# Patient Record
Sex: MALE | Race: WHITE | NOT HISPANIC OR LATINO
[De-identification: names, ages, dates, MRNs, and addresses within clinical notes are randomized per-mention and may not be internally consistent; named-entity substitution may affect disease eponyms.]

---

## 2017-05-03 ENCOUNTER — APPOINTMENT (OUTPATIENT)
Dept: HEART AND VASCULAR | Facility: CLINIC | Age: 77
End: 2017-05-03

## 2017-05-03 VITALS — HEART RATE: 72 BPM

## 2017-05-03 VITALS
SYSTOLIC BLOOD PRESSURE: 146 MMHG | HEIGHT: 71 IN | BODY MASS INDEX: 36.26 KG/M2 | WEIGHT: 259 LBS | DIASTOLIC BLOOD PRESSURE: 80 MMHG

## 2017-05-23 ENCOUNTER — OUTPATIENT (OUTPATIENT)
Dept: OUTPATIENT SERVICES | Facility: HOSPITAL | Age: 77
LOS: 1 days | Discharge: HOME | End: 2017-05-23
Payer: COMMERCIAL

## 2017-05-23 DIAGNOSIS — I48.91 UNSPECIFIED ATRIAL FIBRILLATION: ICD-10-CM

## 2017-05-23 DIAGNOSIS — Z98.890 OTHER SPECIFIED POSTPROCEDURAL STATES: Chronic | ICD-10-CM

## 2017-05-23 PROCEDURE — 99234 HOSP IP/OBS SM DT SF/LOW 45: CPT

## 2017-05-23 PROCEDURE — 92960 CARDIOVERSION ELECTRIC EXT: CPT

## 2017-05-23 RX ORDER — FUROSEMIDE 40 MG
1 TABLET ORAL
Qty: 7 | Refills: 0
Start: 2017-05-23 | End: 2017-05-30

## 2017-07-26 ENCOUNTER — APPOINTMENT (OUTPATIENT)
Dept: HEART AND VASCULAR | Facility: CLINIC | Age: 77
End: 2017-07-26
Payer: MEDICARE

## 2017-07-26 VITALS
SYSTOLIC BLOOD PRESSURE: 134 MMHG | HEART RATE: 97 BPM | DIASTOLIC BLOOD PRESSURE: 89 MMHG | BODY MASS INDEX: 36.12 KG/M2 | WEIGHT: 259 LBS

## 2017-07-26 PROCEDURE — 99215 OFFICE O/P EST HI 40 MIN: CPT

## 2017-07-26 PROCEDURE — 93000 ELECTROCARDIOGRAM COMPLETE: CPT

## 2017-08-08 ENCOUNTER — OTHER (OUTPATIENT)
Age: 77
End: 2017-08-08

## 2017-08-08 RX ORDER — METOPROLOL TARTRATE 50 MG/1
50 TABLET, FILM COATED ORAL TWICE DAILY
Qty: 120 | Refills: 3 | Status: DISCONTINUED | COMMUNITY
Start: 2017-05-03 | End: 2017-08-08

## 2017-08-22 ENCOUNTER — MEDICATION RENEWAL (OUTPATIENT)
Age: 77
End: 2017-08-22

## 2017-08-22 RX ORDER — METOPROLOL TARTRATE 100 MG/1
100 TABLET, FILM COATED ORAL
Qty: 60 | Refills: 6 | Status: ACTIVE | COMMUNITY
Start: 2017-08-08 | End: 1900-01-01

## 2020-07-28 ENCOUNTER — INPATIENT (INPATIENT)
Facility: HOSPITAL | Age: 80
LOS: 2 days | Discharge: HOME | DRG: 308 | End: 2020-07-31
Attending: INTERNAL MEDICINE | Admitting: INTERNAL MEDICINE
Payer: MEDICARE

## 2020-07-28 VITALS
TEMPERATURE: 97 F | OXYGEN SATURATION: 92 % | WEIGHT: 220.02 LBS | DIASTOLIC BLOOD PRESSURE: 58 MMHG | RESPIRATION RATE: 24 BRPM | HEART RATE: 125 BPM | HEIGHT: 70 IN | SYSTOLIC BLOOD PRESSURE: 114 MMHG

## 2020-07-28 DIAGNOSIS — I10 ESSENTIAL (PRIMARY) HYPERTENSION: ICD-10-CM

## 2020-07-28 DIAGNOSIS — Z98.890 OTHER SPECIFIED POSTPROCEDURAL STATES: Chronic | ICD-10-CM

## 2020-07-28 DIAGNOSIS — I48.91 UNSPECIFIED ATRIAL FIBRILLATION: ICD-10-CM

## 2020-07-28 DIAGNOSIS — E78.5 HYPERLIPIDEMIA, UNSPECIFIED: ICD-10-CM

## 2020-07-28 DIAGNOSIS — N17.9 ACUTE KIDNEY FAILURE, UNSPECIFIED: ICD-10-CM

## 2020-07-28 DIAGNOSIS — E87.70 FLUID OVERLOAD, UNSPECIFIED: ICD-10-CM

## 2020-07-28 DIAGNOSIS — Z13.1 ENCOUNTER FOR SCREENING FOR DIABETES MELLITUS: ICD-10-CM

## 2020-07-28 LAB
ALBUMIN SERPL ELPH-MCNC: 4.1 G/DL — SIGNIFICANT CHANGE UP (ref 3.3–5)
ALP SERPL-CCNC: 141 U/L — HIGH (ref 40–120)
ALT FLD-CCNC: 24 U/L — SIGNIFICANT CHANGE UP (ref 10–45)
ANION GAP SERPL CALC-SCNC: 10 MMOL/L — SIGNIFICANT CHANGE UP (ref 5–17)
APPEARANCE UR: CLEAR — SIGNIFICANT CHANGE UP
AST SERPL-CCNC: 30 U/L — SIGNIFICANT CHANGE UP (ref 10–40)
BILIRUB SERPL-MCNC: 1.7 MG/DL — HIGH (ref 0.2–1.2)
BILIRUB UR-MCNC: NEGATIVE — SIGNIFICANT CHANGE UP
BUN SERPL-MCNC: 34 MG/DL — HIGH (ref 7–23)
CALCIUM SERPL-MCNC: 10 MG/DL — SIGNIFICANT CHANGE UP (ref 8.4–10.5)
CHLORIDE SERPL-SCNC: 101 MMOL/L — SIGNIFICANT CHANGE UP (ref 96–108)
CO2 SERPL-SCNC: 27 MMOL/L — SIGNIFICANT CHANGE UP (ref 22–31)
COLOR SPEC: YELLOW — SIGNIFICANT CHANGE UP
CREAT SERPL-MCNC: 1.53 MG/DL — HIGH (ref 0.5–1.3)
DIFF PNL FLD: NEGATIVE — SIGNIFICANT CHANGE UP
GLUCOSE SERPL-MCNC: 93 MG/DL — SIGNIFICANT CHANGE UP (ref 70–99)
GLUCOSE UR QL: NEGATIVE — SIGNIFICANT CHANGE UP
HCT VFR BLD CALC: 41 % — SIGNIFICANT CHANGE UP (ref 39–50)
HGB BLD-MCNC: 13.1 G/DL — SIGNIFICANT CHANGE UP (ref 13–17)
INR BLD: 2.77 — HIGH (ref 0.88–1.16)
KETONES UR-MCNC: NEGATIVE — SIGNIFICANT CHANGE UP
LACTATE SERPL-SCNC: 1.5 MMOL/L — SIGNIFICANT CHANGE UP (ref 0.5–2)
LEUKOCYTE ESTERASE UR-ACNC: NEGATIVE — SIGNIFICANT CHANGE UP
MCHC RBC-ENTMCNC: 32 GM/DL — SIGNIFICANT CHANGE UP (ref 32–36)
MCHC RBC-ENTMCNC: 35 PG — HIGH (ref 27–34)
MCV RBC AUTO: 109.6 FL — HIGH (ref 80–100)
NITRITE UR-MCNC: NEGATIVE — SIGNIFICANT CHANGE UP
NRBC # BLD: 0 /100 WBCS — SIGNIFICANT CHANGE UP (ref 0–0)
NT-PROBNP SERPL-SCNC: 3497 PG/ML — HIGH (ref 0–300)
PH UR: 6 — SIGNIFICANT CHANGE UP (ref 5–8)
PLATELET # BLD AUTO: 152 K/UL — SIGNIFICANT CHANGE UP (ref 150–400)
POTASSIUM SERPL-MCNC: 4.6 MMOL/L — SIGNIFICANT CHANGE UP (ref 3.5–5.3)
POTASSIUM SERPL-SCNC: 4.6 MMOL/L — SIGNIFICANT CHANGE UP (ref 3.5–5.3)
PROT SERPL-MCNC: 7.1 G/DL — SIGNIFICANT CHANGE UP (ref 6–8.3)
PROT UR-MCNC: NEGATIVE MG/DL — SIGNIFICANT CHANGE UP
PROTHROM AB SERPL-ACNC: 31.7 SEC — HIGH (ref 10.6–13.6)
RBC # BLD: 3.74 M/UL — LOW (ref 4.2–5.8)
RBC # FLD: 15.4 % — HIGH (ref 10.3–14.5)
SARS-COV-2 RNA SPEC QL NAA+PROBE: SIGNIFICANT CHANGE UP
SODIUM SERPL-SCNC: 138 MMOL/L — SIGNIFICANT CHANGE UP (ref 135–145)
SP GR SPEC: 1.02 — SIGNIFICANT CHANGE UP (ref 1–1.03)
TROPONIN T SERPL-MCNC: 0.08 NG/ML — CRITICAL HIGH (ref 0–0.01)
UROBILINOGEN FLD QL: 0.2 E.U./DL — SIGNIFICANT CHANGE UP
WBC # BLD: 5.31 K/UL — SIGNIFICANT CHANGE UP (ref 3.8–10.5)
WBC # FLD AUTO: 5.31 K/UL — SIGNIFICANT CHANGE UP (ref 3.8–10.5)

## 2020-07-28 PROCEDURE — 71045 X-RAY EXAM CHEST 1 VIEW: CPT | Mod: 26

## 2020-07-28 PROCEDURE — 99285 EMERGENCY DEPT VISIT HI MDM: CPT | Mod: 25

## 2020-07-28 PROCEDURE — 71275 CT ANGIOGRAPHY CHEST: CPT | Mod: 26

## 2020-07-28 PROCEDURE — 93010 ELECTROCARDIOGRAM REPORT: CPT

## 2020-07-28 RX ORDER — DEXTROSE 50 % IN WATER 50 %
12.5 SYRINGE (ML) INTRAVENOUS ONCE
Refills: 0 | Status: DISCONTINUED | OUTPATIENT
Start: 2020-07-28 | End: 2020-07-31

## 2020-07-28 RX ORDER — SODIUM CHLORIDE 9 MG/ML
500 INJECTION INTRAMUSCULAR; INTRAVENOUS; SUBCUTANEOUS ONCE
Refills: 0 | Status: COMPLETED | OUTPATIENT
Start: 2020-07-28 | End: 2020-07-28

## 2020-07-28 RX ORDER — ATORVASTATIN CALCIUM 80 MG/1
40 TABLET, FILM COATED ORAL AT BEDTIME
Refills: 0 | Status: DISCONTINUED | OUTPATIENT
Start: 2020-07-28 | End: 2020-07-31

## 2020-07-28 RX ORDER — PANTOPRAZOLE SODIUM 20 MG/1
40 TABLET, DELAYED RELEASE ORAL
Refills: 0 | Status: DISCONTINUED | OUTPATIENT
Start: 2020-07-28 | End: 2020-07-31

## 2020-07-28 RX ORDER — INSULIN LISPRO 100/ML
VIAL (ML) SUBCUTANEOUS
Refills: 0 | Status: DISCONTINUED | OUTPATIENT
Start: 2020-07-28 | End: 2020-07-31

## 2020-07-28 RX ORDER — DEXTROSE 50 % IN WATER 50 %
25 SYRINGE (ML) INTRAVENOUS ONCE
Refills: 0 | Status: DISCONTINUED | OUTPATIENT
Start: 2020-07-28 | End: 2020-07-31

## 2020-07-28 RX ORDER — COLCHICINE 0.6 MG
0.6 TABLET ORAL DAILY
Refills: 0 | Status: DISCONTINUED | OUTPATIENT
Start: 2020-07-28 | End: 2020-07-28

## 2020-07-28 RX ORDER — FUROSEMIDE 40 MG
20 TABLET ORAL
Refills: 0 | Status: DISCONTINUED | OUTPATIENT
Start: 2020-07-28 | End: 2020-07-29

## 2020-07-28 RX ORDER — IPRATROPIUM/ALBUTEROL SULFATE 18-103MCG
3 AEROSOL WITH ADAPTER (GRAM) INHALATION ONCE
Refills: 0 | Status: COMPLETED | OUTPATIENT
Start: 2020-07-28 | End: 2020-07-28

## 2020-07-28 RX ORDER — DEXTROSE 50 % IN WATER 50 %
15 SYRINGE (ML) INTRAVENOUS ONCE
Refills: 0 | Status: DISCONTINUED | OUTPATIENT
Start: 2020-07-28 | End: 2020-07-31

## 2020-07-28 RX ORDER — SODIUM CHLORIDE 9 MG/ML
1000 INJECTION, SOLUTION INTRAVENOUS
Refills: 0 | Status: DISCONTINUED | OUTPATIENT
Start: 2020-07-28 | End: 2020-07-31

## 2020-07-28 RX ORDER — RIVAROXABAN 15 MG-20MG
20 KIT ORAL
Refills: 0 | Status: DISCONTINUED | OUTPATIENT
Start: 2020-07-28 | End: 2020-07-31

## 2020-07-28 RX ORDER — GLUCAGON INJECTION, SOLUTION 0.5 MG/.1ML
1 INJECTION, SOLUTION SUBCUTANEOUS ONCE
Refills: 0 | Status: DISCONTINUED | OUTPATIENT
Start: 2020-07-28 | End: 2020-07-31

## 2020-07-28 RX ADMIN — Medication 3 MILLILITER(S): at 17:03

## 2020-07-28 RX ADMIN — SODIUM CHLORIDE 500 MILLILITER(S): 9 INJECTION INTRAMUSCULAR; INTRAVENOUS; SUBCUTANEOUS at 19:03

## 2020-07-28 RX ADMIN — Medication 125 MILLIGRAM(S): at 17:03

## 2020-07-28 NOTE — H&P ADULT - PROBLEM SELECTOR PLAN 3
-unknown baseline, Cr. 1.53  -holding home ACE  -continue to monitor -unknown baseline, Cr. 1.53  -holding ACE/ avoid nephrotoxic agents   -continue to monitor

## 2020-07-28 NOTE — H&P ADULT - NSHPSOCIALHISTORY_GEN_ALL_CORE
Denies tobacco, ETOH, or illicit drug use Former smoker x 20 years, quit 30 years ago  Denies ETOH or illicit drug use

## 2020-07-28 NOTE — ED PROVIDER NOTE - DIAGNOSTIC INTERPRETATION
History of Present Illness


General


Chief Complaint:  To Be Triaged





Medical Decision Making


Diagnostic Impression:  


 Primary Impression:  


 Patient left without being seen


ER Course


patient left prior to RN triage or MD evaluation


Status:  unchanged


Disposition:  LEFT W/OUT BEING SEEN


Condition:  Unknown


Referrals:  


NOT CHOSEN IPA/MD,REFERRING (PCP)











BETZY VASQUEZ M.D. Feb 27, 2017 17:29 ER Physician: Filiberto Jordan  CHEST XRAY INTERPRETATION: lungs L lower lobe effusion, mild haziness R base, heart shadow enlarged, bony structures intact

## 2020-07-28 NOTE — H&P ADULT - ASSESSMENT
Patient is a 81 y/o male with PMHx of afib/flutter (hx of ablation/DCCV, w/recurrent afib/flutter refusing subsequent ablations, on Xarelto), asthma (no intubations), HTN, hypothyroidism, DM, HLD who presents to [facility] ED 07/28/2020 c/o LE edema and palpitations. Now admitted to [facility] for management of fluid overload in the setting of rapid aflutter.

## 2020-07-28 NOTE — ED ADULT NURSE NOTE - NSINTERVENTIONOPT_GEN_ALL_ED
Hourly Rounding/Enhanced Supervision/Move Toilet (commode/urinal) to patient using "arms reach"/Stretcher Alarms

## 2020-07-28 NOTE — H&P ADULT - NSICDXPASTMEDICALHX_GEN_ALL_CORE_FT
PAST MEDICAL HISTORY:  Atrial fibrillation and flutter     DM (diabetes mellitus screen)     Essential hypertension     History of hypothyroidism     Uncomplicated asthma, unspecified asthma severity

## 2020-07-28 NOTE — H&P ADULT - PROBLEM SELECTOR PLAN 1
-On arrival to ED, patient HR 120s in aflutter, currently patient rate controlled HR 80-90s  -EKG on admission: aflutter @ 123bpm, RBBB, Trops x1 0.08, f/u trops @ 11pm and AM  -BB on hold 2/2 fluid overload, continue home xarelto  -EP consult in AM (patient follows up with Dr. Amaya) -On arrival to ED, patient HR 120s in aflutter, currently patient rate controlled HR 80-90s  -EKG on admission: aflutter @ 123bpm, RBBB, Trops x1 0.08, f/u trops @ 11pm and AM  -BB on hold 2/2 fluid overload, continue home xarelto  -EP consult in AM (patient follows up with Dr. Amaya)  -f/u TSH

## 2020-07-28 NOTE — H&P ADULT - HISTORY OF PRESENT ILLNESS
Patient is a 81 y/o male with PMHx of afib/flutter (hx of ablation/DCCV, w/recurrent afib/flutter refusing subsequent ablations, on Xarelto), asthma (no intubations), HTN, hypothyroidism, DM, HLD who presents to [facility] ED 07/28/2020 c/o LE edema.     Patient denies CP, SOB, palpitations, syncope, PND/orthopnea, fever, chills, sick contacts, recent travel/illness, URI symptoms, abdominal pain, or any other complaints.     Of note patient admits to being apartment bound for 6 weeks, rarely ever leaving apartment.     In ED VS /58, , RR 24, Temp 97.2F, O2 92% RA, Labs INR 2.77, Cr 1.53, Trops x1 0.08, BNP 3497, UA negative, COVID negative, EKG a-flutter @ 123bpm, RBBB, CXR wet read left sided effusion, cardiomegaly. In ED patient give IVF 1/2L bolus x1, Duonebs x1, and IV solu-medrol 125mg x1. Upon examination no current complaints, HR 80-90s, satting 100% 4L NC.     Patient now admitted to [facility] for management of fluid overload in the setting of rapid aflutter Patient is a 79 y/o male with PMHx of afib/flutter (hx of ablation/DCCV, w/recurrent afib/flutter refusing subsequent ablations, on Xarelto), asthma (no intubations), HTN, hypothyroidism, DM, HLD who presents to [facility] ED 07/28/2020 c/o LE edema.     Patient denies CP, SOB, palpitations, syncope, PND/orthopnea, fever, chills, sick contacts, recent travel/illness, URI symptoms, abdominal pain, or any other complaints.     Of note patient admits to being apartment bound for 6 weeks, rarely ever leaving apartment.     In ED VS /58, , RR 24, Temp 97.2F, O2 92% RA, Labs INR 2.77, Cr 1.53, Trops x1 0.08, BNP 3497, UA negative, COVID negative, EKG a-flutter @ 123bpm, RBBB, CXR wet read left sided effusion, vascular congestion, cardiomegaly. In ED patient give IVF 1/2L bolus x1, Duonebs x1, and IV solu-medrol 125mg x1. Upon examination no current complaints, HR 80-90s, satting 100% 4L NC.     Patient now admitted to [facility] for management of fluid overload in the setting of rapid aflutter Patient is a 81 y/o male with PMHx of afib/flutter (hx of ablation/DCCV, w/recurrent afib/flutter refusing subsequent ablations, on Xarelto), asthma (no intubations), HTN, hypothyroidism, DM, HLD who presents to [facility] ED 07/28/2020 c/o LE edema and palpitations.     Patient denies CP, SOB, syncope, PND/orthopnea, fever, chills, sick contacts, recent travel/illness, URI symptoms, abdominal pain, or any other complaints.     Of note patient admits to being apartment bound for 6 weeks, rarely ever leaving apartment.     In ED VS /58, , RR 24, Temp 97.2F, O2 92% RA, Labs INR 2.77, Cr 1.53, Trops x1 0.08, BNP 3497, UA negative, COVID negative, EKG a-flutter @ 123bpm, RBBB, CXR wet read left sided effusion, vascular congestion, cardiomegaly. In ED patient give IVF 1/2L bolus x1, Duonebs x1, and IV solu-medrol 125mg x1. Upon examination no current complaints, HR 80-90s, satting 100% 4L NC.     Patient now admitted to [facility] for management of fluid overload in the setting of rapid aflutter Patient is a 79 y/o male former smoker with PMHx of afib/flutter (hx of ablation/DCCV, w/recurrent afib/flutter refusing subsequent ablations, on Xarelto), asthma (no intubations), HTN, hypothyroidism, DM, HLD who presents to [facility] ED 07/28/2020 c/o LE edema, SOB, and palpitations. Patient c/o 6 weeks of worsening LE edema, and abdominal distension. He explains since swelling has worsened he is no longer able to fit in his sneakers. He also endorses intermittent episodes of progressively worsening SOB which occurs independent of activity. Patient reports for the last two days he fluttering in chest which exacerbates his SOB. Since symptoms started he states his exercise tolerance has worsened, and ADLs have been harder to do. Patient denies   syncope, PND/orthopnea, fever, chills, sick contacts, recent travel/illness, URI symptoms, abdominal pain, or any other complaints. Of note patient admits to being apartment bound for 6 weeks, rarely ever leaving apartment. In ED VS /58, , RR 24, Temp 97.2F, O2 92% RA, Labs INR 2.77, Cr 1.53, Trops x1 0.08, BNP 3497, UA negative, COVID negative, EKG a-flutter @ 123bpm, RBBB, CXR wet read left sided effusion, vascular congestion, cardiomegaly. In ED patient give IVF 1/2L bolus x1, Duonebs x1, and IV solu-medrol 125mg x1. Upon examination no current complaints, HR 80-90s, satting 100% 4L NC.     Patient now admitted to [facility] for management of fluid overload in the setting of rapid aflutter Patient is a 79 y/o male former smoker with PMHx of afib/flutter (hx of ablation/DCCV, w/recurrent afib/flutter refusing subsequent ablations, on Xarelto), asthma (no intubations), HTN, hypothyroidism, DM, HLD who presents to [facility] ED 07/28/2020 c/o LE edema, SOB, and palpitations. Patient c/o 6 weeks of worsening LE edema, and abdominal distension. He explains since swelling has worsened he is no longer able to fit in his sneakers. He also endorses intermittent episodes of progressively worsening SOB which occurs independent of activity. Patient reports for the last two days fluttering in chest which exacerbates his SOB. Since symptoms started he states his exercise tolerance has worsened, and ADLs have been harder to do. Patient denies syncope, PND/orthopnea, fever, chills, sick contacts, recent travel/illness, URI symptoms, abdominal pain, or any other complaints. Of note patient admits to being apartment bound for 6 weeks, rarely ever leaving apartment. In ED VS /58, , RR 24, Temp 97.2F, O2 92% RA, Labs INR 2.77, Cr 1.53, Trops x1 0.08, BNP 3497, UA negative, COVID negative, EKG a-flutter @ 123bpm, RBBB, CXR wet read left sided effusion, vascular congestion, cardiomegaly. In ED patient give IVF 1/2L bolus x1, Duonebs x1, and IV solu-medrol 125mg x1. Upon examination no current complaints, HR 80-90s, satting 100% 4L NC.     Patient now admitted to [facility] for management of fluid overload in the setting of rapid aflutter

## 2020-07-28 NOTE — H&P ADULT - PROBLEM SELECTOR PLAN 2
-3+ pitting edema bilaterally, mild cyanosis of LE, warm to touch, on admission satting 92% on RA, O2 sat 100% on 4L NC  --given Duonebs x1 and IV solu-medrol 125mg x1 in ED  -CXR wet read left sided pleural effusion, vascular congestion, cardiomegaly, f/u official  -started IV Lasix 20mg BID  -core measures ordered, 1L fluid restrictions, strict I/Os, daily weights   -Echo and LE duplex ordered f/u, f/u D-dimer -3+ pitting edema bilaterally, mild cyanosis of LE, warm to touch, on admission satting 92% on RA, O2 sat 100% on 4L NC  --given Duonebs x1 and IV solu-medrol 125mg x1 in ED  -CXR wet read left sided pleural effusion, vascular congestion, cardiomegaly, f/u official  -started IV Lasix 20mg BID  -core measures ordered, 1L fluid restrictions, strict I/Os, daily weights   -Echo and LE duplex ordered f/u, f/u D-dimer  -Blood Cx sent in ED f/u -No hx of CHF per patient   -3+ pitting edema bilaterally, mild cyanosis of LE, warm to touch, on admission satting 92% on RA, O2 sat 100% on 4L NC  --given Duonebs x1 and IV solu-medrol 125mg x1 in ED  -CXR wet read left sided pleural effusion, vascular congestion, cardiomegaly, f/u official  -started IV Lasix 20mg BID  -core measures ordered, 1L fluid restrictions, strict I/Os, daily weights   -Echo and LE duplex ordered f/u, f/u D-dimer  -Blood Cx sent in ED f/u -No hx of CHF per patient   -3+ pitting edema bilaterally, mild cyanosis of LE, warm to touch, on admission satting 92% on RA, O2 sat 100% on 4L NC, BNP 3497  --given Duonebs x1 and IV solu-medrol 125mg x1 in ED  -CXR wet read left sided pleural effusion, vascular congestion, cardiomegaly, f/u official  -started IV Lasix 20mg BID  -core measures ordered, 1L fluid restrictions, strict I/Os, daily weights   -Echo and LE duplex ordered f/u, f/u D-dimer  -Blood Cx sent in ED f/u -No hx of CHF per patient   -3+ pitting edema bilaterally, mild cyanosis of LE, warm to touch, on admission satting 92% on RA, O2 sat 100% on 4L NC, BNP 3497, given IV Lasix 20mg x1 stat on admission   --given Duonebs x1 and IV solu-medrol 125mg x1 in ED  -CXR wet read left sided pleural effusion, vascular congestion, cardiomegaly, f/u official  -started IV Lasix 20mg BID  -core measures ordered, 1L fluid restrictions, strict I/Os, daily weights   -Echo and LE duplex ordered f/u, f/u D-dimer  -Blood Cx sent in ED f/u -No hx of CHF per patient   -3+ pitting edema bilaterally, JVD, decreased BS LLL, on admission satting 92% on RA, O2 sat 100% on 4L NC, BNP 3497, given IV Lasix 20mg x1 stat on admission   --given Duonebs x1 and IV solu-medrol 125mg x1 in ED  -CXR wet read left sided pleural effusion, vascular congestion, cardiomegaly, f/u official  -started IV Lasix 20mg BID  -core measures ordered, 1L fluid restrictions, strict I/Os, daily weights   -Echo and LE duplex ordered f/u, f/u D-dimer  -Blood Cx sent in ED f/u

## 2020-07-28 NOTE — H&P ADULT - PROBLEM SELECTOR PLAN 6
-f/u LDL  -continue atorvastatin 40mg QD    PT consulted  DVT PPx: Xarelto  Dispo: EP consult, diuresis

## 2020-07-28 NOTE — ED ADULT TRIAGE NOTE - CHIEF COMPLAINT QUOTE
Patient presents complaining of worsening sob, diarrhea, and bilateral feet and leg swelling x 6 weeks.  Patient presents without shoes on because he can no longer wear his sneakers. Denies fevers or chills.

## 2020-07-28 NOTE — PROGRESS NOTE ADULT - SUBJECTIVE AND OBJECTIVE BOX
79 y/o male hx ablation dr jackson  cardiomyopathy recurrent aflutter refused ablation admitted with atrial flutter fluid overload on beta blocker and xarelto hemodynamically stable lungs reduced air entry bilateral bases heart s1s2 2/6 holosystolic apical murmur bp 130/80 diuretics echo ep to see discussed with all concerned

## 2020-07-28 NOTE — ED PROVIDER NOTE - OBJECTIVE STATEMENT
81 y/o male hx ablation dr jackson cardiomyopathy recurrent aflutter refused ablation admitted with atrial flutter fluid overload on beta blocker and xarelto hemodynamically stable lungs reduced air entry bilateral bases heart s1s2 2/6 holosystolic apical murmur bp 130/80 diuretics echo ep to see discussed with all concerned 81 y/o male pmh asthma, HTN, hx ablation dr jackson cardiomyopathy recurrent afib/aflutter refused ablation admitted with atrial flutter fluid overload on beta blocker and xarelto hemodynamically stable co BL leg swelling/cramps on Xarelto for afib co sob/weakness  px is apt bound-rarely leaves his apt- no orthopnea  moderate-severe  exac- movement/walking  no sig allev factors

## 2020-07-28 NOTE — ED PROVIDER NOTE - CONSTITUTIONAL, MLM
Yes normal... mild resp distress on rm air, awake, alert, oriented to person, place, time/situation and in no apparent distress.

## 2020-07-28 NOTE — ED PROVIDER NOTE - CARDIAC, MLM
irreg irreg rapid vent response at 120  Heart sounds S1, S2. mild cyanosis to BL LE 3+ edema just above ankle

## 2020-07-29 DIAGNOSIS — J45.909 UNSPECIFIED ASTHMA, UNCOMPLICATED: ICD-10-CM

## 2020-07-29 LAB
A1C WITH ESTIMATED AVERAGE GLUCOSE RESULT: 6.1 % — HIGH (ref 4–5.6)
ANION GAP SERPL CALC-SCNC: 8 MMOL/L — SIGNIFICANT CHANGE UP (ref 5–17)
BUN SERPL-MCNC: 34 MG/DL — HIGH (ref 7–23)
CALCIUM SERPL-MCNC: 9.4 MG/DL — SIGNIFICANT CHANGE UP (ref 8.4–10.5)
CHLORIDE SERPL-SCNC: 103 MMOL/L — SIGNIFICANT CHANGE UP (ref 96–108)
CHOLEST SERPL-MCNC: 118 MG/DL — SIGNIFICANT CHANGE UP (ref 10–199)
CO2 SERPL-SCNC: 28 MMOL/L — SIGNIFICANT CHANGE UP (ref 22–31)
CREAT SERPL-MCNC: 1.42 MG/DL — HIGH (ref 0.5–1.3)
D DIMER BLD IA.RAPID-MCNC: 774 NG/ML DDU — HIGH
ESTIMATED AVERAGE GLUCOSE: 128 MG/DL — HIGH (ref 68–114)
GLUCOSE BLDC GLUCOMTR-MCNC: 110 MG/DL — HIGH (ref 70–99)
GLUCOSE BLDC GLUCOMTR-MCNC: 117 MG/DL — HIGH (ref 70–99)
GLUCOSE BLDC GLUCOMTR-MCNC: 162 MG/DL — HIGH (ref 70–99)
GLUCOSE BLDC GLUCOMTR-MCNC: 193 MG/DL — HIGH (ref 70–99)
GLUCOSE SERPL-MCNC: 161 MG/DL — HIGH (ref 70–99)
HCT VFR BLD CALC: 38 % — LOW (ref 39–50)
HDLC SERPL-MCNC: 41 MG/DL — SIGNIFICANT CHANGE UP
HGB BLD-MCNC: 12.1 G/DL — LOW (ref 13–17)
LIPID PNL WITH DIRECT LDL SERPL: 65 MG/DL — SIGNIFICANT CHANGE UP
MAGNESIUM SERPL-MCNC: 1.6 MG/DL — SIGNIFICANT CHANGE UP (ref 1.6–2.6)
MCHC RBC-ENTMCNC: 31.8 GM/DL — LOW (ref 32–36)
MCHC RBC-ENTMCNC: 35.8 PG — HIGH (ref 27–34)
MCV RBC AUTO: 112.4 FL — HIGH (ref 80–100)
NRBC # BLD: 0 /100 WBCS — SIGNIFICANT CHANGE UP (ref 0–0)
PLATELET # BLD AUTO: 110 K/UL — LOW (ref 150–400)
POTASSIUM SERPL-MCNC: 4.8 MMOL/L — SIGNIFICANT CHANGE UP (ref 3.5–5.3)
POTASSIUM SERPL-SCNC: 4.8 MMOL/L — SIGNIFICANT CHANGE UP (ref 3.5–5.3)
RBC # BLD: 3.38 M/UL — LOW (ref 4.2–5.8)
RBC # FLD: 15.3 % — HIGH (ref 10.3–14.5)
SODIUM SERPL-SCNC: 139 MMOL/L — SIGNIFICANT CHANGE UP (ref 135–145)
T3 SERPL-MCNC: 54 NG/DL — LOW (ref 80–200)
T4 AB SER-ACNC: 5.7 UG/DL — SIGNIFICANT CHANGE UP (ref 3.17–11.72)
TOTAL CHOLESTEROL/HDL RATIO MEASUREMENT: 2.9 RATIO — LOW (ref 3.4–9.6)
TRIGL SERPL-MCNC: 58 MG/DL — SIGNIFICANT CHANGE UP (ref 10–149)
TROPONIN T SERPL-MCNC: 0.03 NG/ML — HIGH (ref 0–0.01)
TROPONIN T SERPL-MCNC: 0.04 NG/ML — CRITICAL HIGH (ref 0–0.01)
TSH SERPL-MCNC: 0.96 UIU/ML — SIGNIFICANT CHANGE UP (ref 0.35–4.94)
WBC # BLD: 3.7 K/UL — LOW (ref 3.8–10.5)
WBC # FLD AUTO: 3.7 K/UL — LOW (ref 3.8–10.5)

## 2020-07-29 PROCEDURE — 93306 TTE W/DOPPLER COMPLETE: CPT | Mod: 26

## 2020-07-29 PROCEDURE — 99223 1ST HOSP IP/OBS HIGH 75: CPT

## 2020-07-29 PROCEDURE — 93970 EXTREMITY STUDY: CPT | Mod: 26

## 2020-07-29 RX ORDER — BUDESONIDE, MICRONIZED 100 %
0.25 POWDER (GRAM) MISCELLANEOUS EVERY 12 HOURS
Refills: 0 | Status: DISCONTINUED | OUTPATIENT
Start: 2020-07-29 | End: 2020-07-31

## 2020-07-29 RX ORDER — LEVOTHYROXINE SODIUM 125 MCG
50 TABLET ORAL DAILY
Refills: 0 | Status: DISCONTINUED | OUTPATIENT
Start: 2020-07-29 | End: 2020-07-31

## 2020-07-29 RX ORDER — METOPROLOL TARTRATE 50 MG
50 TABLET ORAL EVERY 12 HOURS
Refills: 0 | Status: DISCONTINUED | OUTPATIENT
Start: 2020-07-29 | End: 2020-07-31

## 2020-07-29 RX ORDER — LEVALBUTEROL 1.25 MG/.5ML
0.63 SOLUTION, CONCENTRATE RESPIRATORY (INHALATION) EVERY 6 HOURS
Refills: 0 | Status: DISCONTINUED | OUTPATIENT
Start: 2020-07-29 | End: 2020-07-31

## 2020-07-29 RX ORDER — MAGNESIUM OXIDE 400 MG ORAL TABLET 241.3 MG
400 TABLET ORAL ONCE
Refills: 0 | Status: COMPLETED | OUTPATIENT
Start: 2020-07-29 | End: 2020-07-29

## 2020-07-29 RX ORDER — FUROSEMIDE 40 MG
20 TABLET ORAL ONCE
Refills: 0 | Status: COMPLETED | OUTPATIENT
Start: 2020-07-29 | End: 2020-07-29

## 2020-07-29 RX ORDER — FUROSEMIDE 40 MG
40 TABLET ORAL EVERY 12 HOURS
Refills: 0 | Status: DISCONTINUED | OUTPATIENT
Start: 2020-07-29 | End: 2020-07-31

## 2020-07-29 RX ORDER — MAGNESIUM SULFATE 500 MG/ML
1 VIAL (ML) INJECTION ONCE
Refills: 0 | Status: COMPLETED | OUTPATIENT
Start: 2020-07-29 | End: 2020-07-29

## 2020-07-29 RX ORDER — IPRATROPIUM BROMIDE 0.2 MG/ML
500 SOLUTION, NON-ORAL INHALATION EVERY 6 HOURS
Refills: 0 | Status: DISCONTINUED | OUTPATIENT
Start: 2020-07-29 | End: 2020-07-31

## 2020-07-29 RX ADMIN — Medication 50 MICROGRAM(S): at 05:57

## 2020-07-29 RX ADMIN — ATORVASTATIN CALCIUM 40 MILLIGRAM(S): 80 TABLET, FILM COATED ORAL at 22:05

## 2020-07-29 RX ADMIN — Medication 0.25 MILLIGRAM(S): at 18:02

## 2020-07-29 RX ADMIN — Medication 50 MILLIGRAM(S): at 07:37

## 2020-07-29 RX ADMIN — PANTOPRAZOLE SODIUM 40 MILLIGRAM(S): 20 TABLET, DELAYED RELEASE ORAL at 05:57

## 2020-07-29 RX ADMIN — Medication 2: at 07:04

## 2020-07-29 RX ADMIN — Medication 40 MILLIGRAM(S): at 18:03

## 2020-07-29 RX ADMIN — RIVAROXABAN 20 MILLIGRAM(S): KIT at 18:02

## 2020-07-29 RX ADMIN — Medication 100 GRAM(S): at 13:08

## 2020-07-29 RX ADMIN — Medication 2: at 14:46

## 2020-07-29 RX ADMIN — Medication 20 MILLIGRAM(S): at 00:25

## 2020-07-29 RX ADMIN — Medication 20 MILLIGRAM(S): at 06:47

## 2020-07-29 RX ADMIN — MAGNESIUM OXIDE 400 MG ORAL TABLET 400 MILLIGRAM(S): 241.3 TABLET ORAL at 13:09

## 2020-07-29 RX ADMIN — Medication 20 MILLIGRAM(S): at 07:37

## 2020-07-29 RX ADMIN — Medication 50 MILLIGRAM(S): at 18:02

## 2020-07-29 NOTE — CONSULT NOTE ADULT - SUBJECTIVE AND OBJECTIVE BOX
HPI:   80 year old male w/ hx of cardiomyopathy, HTN, recurrent a-fib/flutter (s/p ablations, DCCV), Asthma admitted to cardiology telemetry for CHF exacerbation, volume overload, requiring IV diuresis. Patient has been reluctant for subsequent ablations for atrial fibrillation. Upon admission, patient w/ sinus tachycardic with intermittent atrial fibrillation w/ RVR. At home, he is on Toprol 100mg daily for rate control, which has been held. Since initiating Toprol 50mg q12h today, he has been in atrial fibrillation/ flutter on telemetry rate controlled w/ HR 60-80. Patient seen and examined at bedside, requiring nasal cannula, reports improvement in respiratory status.       PAST MEDICAL & SURGICAL HISTORY:  Uncomplicated asthma, unspecified asthma severity  History of hypothyroidism  DM (diabetes mellitus screen)  Essential hypertension  Atrial fibrillation and flutter  S/P AV kishan ablation  History of cardioversion: ,       HOME MEDICATIONS  T(C): 36.2 (20 @ 09:12), Max: 36.7 (20 @ 19:59)  HR: 95 (20 @ 08:48) (94 - 125)  BP: 143/92 (20 @ 08:48) (108/70 - 143/92)  RR: 20 (20 @ 08:48) (20 - 24)  SpO2: 98% (20 @ 08:48) (92% - 100%)    MEDICATIONS  (STANDING):  atorvastatin 40 milliGRAM(s) Oral at bedtime  buDESOnide    Inhalation Suspension 0.25 milliGRAM(s) Inhalation every 12 hours  dextrose 5%. 1000 milliLiter(s) (50 mL/Hr) IV Continuous <Continuous>  dextrose 50% Injectable 12.5 Gram(s) IV Push once  dextrose 50% Injectable 25 Gram(s) IV Push once  dextrose 50% Injectable 25 Gram(s) IV Push once  furosemide   Injectable 40 milliGRAM(s) IV Push every 12 hours  insulin lispro (HumaLOG) corrective regimen sliding scale   SubCutaneous Before meals and at bedtime  levothyroxine 50 MICROGram(s) Oral daily  magnesium oxide 400 milliGRAM(s) Oral once  magnesium sulfate  IVPB 1 Gram(s) IV Intermittent once  metoprolol succinate ER 50 milliGRAM(s) Oral every 12 hours  pantoprazole    Tablet 40 milliGRAM(s) Oral before breakfast  rivaroxaban 20 milliGRAM(s) Oral with dinner    MEDICATIONS  (PRN):  dextrose 40% Gel 15 Gram(s) Oral once PRN Blood Glucose LESS THAN 70 milliGRAM(s)/deciliter  glucagon  Injectable 1 milliGRAM(s) IntraMuscular once PRN Glucose LESS THAN 70 milligrams/deciliter  ipratropium    for Nebulization 500 MICROGram(s) Nebulizer every 6 hours PRN Shortness of Breath and/or Wheezing  levalbuterol Inhalation 0.63 milliGRAM(s) Inhalation every 6 hours PRN SOB/wheezing      REVIEW OF SYSTEMS:  CONSTITUTIONAL: No fever, weight loss, or fatigue  EYES: No eye pain, visual disturbances, or discharge  ENMT:  No difficulty hearing, tinnitus, vertigo; No sinus or throat pain  NECK: No pain or stiffness  RESPIRATORY: +Shortness of Breath  CARDIOVASCULAR: No chest pain, palpitations, passing out, dizziness, or leg swelling  GASTROINTESTINAL: No abdominal or epigastric pain. No nausea, vomiting, or hematemesis; No diarrhea or constipation. No melena or hematochezia.  GENITOURINARY: No dysuria, frequency, hematuria, or incontinence  NEUROLOGICAL: No headaches, memory loss, loss of strength, numbness, or tremors  SKIN: No itching, burning, rashes, or lesions   LYMPH Nodes: No enlarged glands  ENDOCRINE: No heat or cold intolerance; No hair loss  MUSCULOSKELETAL: No joint pain or swelling; No muscle, back, or extremity pain  PSYCHIATRIC: No depression, anxiety, mood swings, or difficulty sleeping  HEME/LYMPH: No easy bruising, or bleeding gums  ALLERY AND IMMUNOLOGIC: No hives or eczema    Vitals past 24 Hours: T(C): 36.2 (20 @ 09:12), Max: 36.7 (20 @ 19:59)  HR: 95 (20 @ 08:48) (94 - 125)  BP: 143/92 (20 @ 08:48) (108/70 - 143/92)  RR: 20 (20 @ 08:48) (20 - 24)  SpO2: 98% (20 @ 08:48) (92% - 100%)	    Appearance: Obese man laying in hospital bed on 3L NC in NAD	  HEENT:   Normal oral mucosa,  EOMI	  Neck: + JVD  Cardiovascular: Irregularly irregular, tachy  Respiratory: Decreased BS to LLL, crackles to R base  Gastrointestinal:  Abdominal ascites, NT, BS + x4  Skin: Scab to L shin (dime-size)  Extremities: 3+ pitting edema to B/L LE up to knee, 1+ pitting edema to posterior thigh  Vascular: DP/PT faint B/L  Neurologic: Non-focal  Psychiatry: A & O x 3, Mood & affect appropriate      I&O's Detail    2020 07:01  -  2020 07:00  --------------------------------------------------------  IN:  Total IN: 0 mL    OUT:    Voided: 1575 mL  Total OUT: 1575 mL    Total NET: -1575 mL      2020 07:01  -  2020 12:23  --------------------------------------------------------  IN:    Oral Fluid: 180 mL  Total IN: 180 mL    OUT:    Voided: 300 mL  Total OUT: 300 mL    Total NET: -120 mL          LABS:                        12.1   3.70  )-----------( 110      ( 2020 07:39 )             38.0     07-29    139  |  103  |  34<H>  ----------------------------<  161<H>  4.8   |  28  |  1.42<H>    Ca    9.4      2020 07:39  Mg     1.6     -    TPro  7.1  /  Alb  4.1  /  TBili  1.7<H>  /  DBili  x   /  AST  30  /  ALT  24  /  AlkPhos  141<H>  07-28    CARDIAC MARKERS ( 2020 07:39 )  x     / 0.04 ng/mL / x     / x     / x      CARDIAC MARKERS ( 2020 00:33 )  x     / 0.03 ng/mL / x     / x     / x      CARDIAC MARKERS ( 2020 16:43 )  x     / 0.08 ng/mL / 218 U/L / x     / x          PT/INR - ( 2020 16:43 )   PT: 31.7 sec;   INR: 2.77            Urinalysis Basic - ( 2020 17:47 )    Color: Yellow / Appearance: Clear / S.020 / pH: x  Gluc: x / Ketone: NEGATIVE  / Bili: Negative / Urobili: 0.2 E.U./dL   Blood: x / Protein: NEGATIVE mg/dL / Nitrite: NEGATIVE   Leuk Esterase: NEGATIVE / RBC: x / WBC x   Sq Epi: x / Non Sq Epi: x / Bacteria: x      I&O's Summary    2020 07:  -  2020 07:00  --------------------------------------------------------  IN: 0 mL / OUT: 1575 mL / NET: -1575 mL    2020 07:01  -  2020 12:24  --------------------------------------------------------  IN: 180 mL / OUT: 300 mL / NET: -120 mL        RADIOLOGY & ADDITIONAL STUDIES: HPI:   80 year old male w/ hx of cardiomyopathy, HTN, recurrent a-fib/flutter (s/p ablations, DCCV), Asthma admitted to cardiology telemetry for CHF exacerbation, volume overload, requiring IV diuresis. Patient has been reluctant for subsequent ablations for atrial fibrillation. Upon admission, patient w/ sinus tachycardic with intermittent atrial fibrillation w/ RVR. At home, he is on Toprol 100mg daily for rate control, which has been held. Since initiating Toprol 50mg q12h today, he has been in atrial fibrillation/ flutter on telemetry rate controlled w/ HR 60-80. Patient seen and examined at bedside, requiring nasal cannula, reports improvement in respiratory status.       PAST MEDICAL & SURGICAL HISTORY:  Uncomplicated asthma, unspecified asthma severity  History of hypothyroidism  DM (diabetes mellitus screen)  Essential hypertension  Atrial fibrillation and flutter  S/P AV kishan ablation  History of cardioversion: ,       HOME MEDICATIONS  T(C): 36.2 (20 @ 09:12), Max: 36.7 (20 @ 19:59)  HR: 95 (20 @ 08:48) (94 - 125)  BP: 143/92 (20 @ 08:48) (108/70 - 143/92)  RR: 20 (20 @ 08:48) (20 - 24)  SpO2: 98% (20 @ 08:48) (92% - 100%)    MEDICATIONS  (STANDING):  atorvastatin 40 milliGRAM(s) Oral at bedtime  buDESOnide    Inhalation Suspension 0.25 milliGRAM(s) Inhalation every 12 hours  dextrose 5%. 1000 milliLiter(s) (50 mL/Hr) IV Continuous <Continuous>  dextrose 50% Injectable 12.5 Gram(s) IV Push once  dextrose 50% Injectable 25 Gram(s) IV Push once  dextrose 50% Injectable 25 Gram(s) IV Push once  furosemide   Injectable 40 milliGRAM(s) IV Push every 12 hours  insulin lispro (HumaLOG) corrective regimen sliding scale   SubCutaneous Before meals and at bedtime  levothyroxine 50 MICROGram(s) Oral daily  magnesium oxide 400 milliGRAM(s) Oral once  magnesium sulfate  IVPB 1 Gram(s) IV Intermittent once  metoprolol succinate ER 50 milliGRAM(s) Oral every 12 hours  pantoprazole    Tablet 40 milliGRAM(s) Oral before breakfast  rivaroxaban 20 milliGRAM(s) Oral with dinner    MEDICATIONS  (PRN):  dextrose 40% Gel 15 Gram(s) Oral once PRN Blood Glucose LESS THAN 70 milliGRAM(s)/deciliter  glucagon  Injectable 1 milliGRAM(s) IntraMuscular once PRN Glucose LESS THAN 70 milligrams/deciliter  ipratropium    for Nebulization 500 MICROGram(s) Nebulizer every 6 hours PRN Shortness of Breath and/or Wheezing  levalbuterol Inhalation 0.63 milliGRAM(s) Inhalation every 6 hours PRN SOB/wheezing      REVIEW OF SYSTEMS:  CONSTITUTIONAL: No fever, weight loss, or fatigue  EYES: No eye pain, visual disturbances, or discharge  ENMT:  No difficulty hearing, tinnitus, vertigo; No sinus or throat pain  NECK: No pain or stiffness  RESPIRATORY: +Shortness of Breath  CARDIOVASCULAR: No chest pain, palpitations, passing out, dizziness, or leg swelling  GASTROINTESTINAL: No abdominal or epigastric pain. No nausea, vomiting, or hematemesis; No diarrhea or constipation. No melena or hematochezia.  GENITOURINARY: No dysuria, frequency, hematuria, or incontinence  NEUROLOGICAL: No headaches, memory loss, loss of strength, numbness, or tremors  SKIN: No itching, burning, rashes, or lesions   LYMPH Nodes: No enlarged glands  ENDOCRINE: No heat or cold intolerance; No hair loss  MUSCULOSKELETAL: No joint pain or swelling; No muscle, back, or extremity pain  PSYCHIATRIC: No depression, anxiety, mood swings, or difficulty sleeping  HEME/LYMPH: No easy bruising, or bleeding gums  ALLERY AND IMMUNOLOGIC: No hives or eczema    Vitals past 24 Hours: T(C): 36.2 (20 @ 09:12), Max: 36.7 (20 @ 19:59)  HR: 95 (20 @ 08:48) (94 - 125)  BP: 143/92 (20 @ 08:48) (108/70 - 143/92)  RR: 20 (20 @ 08:48) (20 - 24)  SpO2: 98% (20 @ 08:48) (92% - 100%)	    Appearance: Obese man laying in hospital bed on 3L NC in NAD	  HEENT:   Normal oral mucosa,  EOMI	  Neck: + JVD  Cardiovascular: Irregularly irregular, tachy  Respiratory: Decreased BS to LLL, crackles to R base  Gastrointestinal:  Abdominal ascites, NT, BS + x4  Skin: Scab to L shin (dime-size)  Extremities: 3+ pitting edema to B/L LE up to knee, 1+ pitting edema to posterior thigh  Vascular: DP/PT faint B/L  Neurologic: Non-focal  Psychiatry: A & O x 3, Mood & affect appropriate      I&O's Detail    2020 07:01  -  2020 07:00  --------------------------------------------------------  IN:  Total IN: 0 mL    OUT:    Voided: 1575 mL  Total OUT: 1575 mL    Total NET: -1575 mL      2020 07:01  -  2020 12:23  --------------------------------------------------------  IN:    Oral Fluid: 180 mL  Total IN: 180 mL    OUT:    Voided: 300 mL  Total OUT: 300 mL    Total NET: -120 mL          LABS:                        12.1   3.70  )-----------( 110      ( 2020 07:39 )             38.0     07-29    139  |  103  |  34<H>  ----------------------------<  161<H>  4.8   |  28  |  1.42<H>    Ca    9.4      2020 07:39  Mg     1.6     -    TPro  7.1  /  Alb  4.1  /  TBili  1.7<H>  /  DBili  x   /  AST  30  /  ALT  24  /  AlkPhos  141<H>  07-28    CARDIAC MARKERS ( 2020 07:39 )  x     / 0.04 ng/mL / x     / x     / x      CARDIAC MARKERS ( 2020 00:33 )  x     / 0.03 ng/mL / x     / x     / x      CARDIAC MARKERS ( 2020 16:43 )  x     / 0.08 ng/mL / 218 U/L / x     / x          PT/INR - ( 2020 16:43 )   PT: 31.7 sec;   INR: 2.77            Urinalysis Basic - ( 2020 17:47 )    Color: Yellow / Appearance: Clear / S.020 / pH: x  Gluc: x / Ketone: NEGATIVE  / Bili: Negative / Urobili: 0.2 E.U./dL   Blood: x / Protein: NEGATIVE mg/dL / Nitrite: NEGATIVE   Leuk Esterase: NEGATIVE / RBC: x / WBC x   Sq Epi: x / Non Sq Epi: x / Bacteria: x      I&O's Summary    2020 07:  -  2020 07:00  --------------------------------------------------------  IN: 0 mL / OUT: 1575 mL / NET: -1575 mL    2020 07:01  -  2020 12:24  --------------------------------------------------------  IN: 180 mL / OUT: 300 mL / NET: -120 mL        RADIOLOGY & ADDITIONAL STUDIES:  < from: TTE Echo Complete w/o Contrast w/ Doppler (20 @ 15:12) >  CONCLUSIONS:     1. Normal left ventricular size and function.   2. Mild symmetric left ventricular hypertrophy.   3. Mildly dilated right ventricular size.   4. Normal right ventricular systolic function.   5. Severely dilated right atrium.   6. Aortic sclerosis without significant stenosis.   7. Pulmonary hypertension present, pulmonary artery systolic pressure is 66 mmHg.   8. No pericardial effusion.

## 2020-07-29 NOTE — PROGRESS NOTE ADULT - PROBLEM SELECTOR PLAN 4
-SBP stable 110s  -holding home BB and ACE, in setting of MARILU and fluid overload  -monitor BP SBP 100s-140s  - c/w Toprol XL 50 mg PO BID, Lasix 40 mg IV BID  - Hold home HCTZ 50 mg PO QD for now

## 2020-07-29 NOTE — PROGRESS NOTE ADULT - SUBJECTIVE AND OBJECTIVE BOX
Interventional Cardiology PA Adult Progress Note    Subjective Assessment:  Pt seen and evaluated at bedside. Feels uncomfortable 2/2 LE edema. SOB improving. Urinating a lot. Denies CP, dizziness, palpitations, N/V. abdominal pain. All other ROS otherwise negative except per subjective.   	  MEDICATIONS:  furosemide   Injectable 40 milliGRAM(s) IV Push every 12 hours  metoprolol succinate ER 50 milliGRAM(s) Oral every 12 hour  buDESOnide    Inhalation Suspension 0.25 milliGRAM(s) Inhalation every 12 hours  ipratropium    for Nebulization 500 MICROGram(s) Nebulizer every 6 hours PRN  levalbuterol Inhalation 0.63 milliGRAM(s) Inhalation every 6 hours PRN  pantoprazole    Tablet 40 milliGRAM(s) Oral before breakfast  atorvastatin 40 milliGRAM(s) Oral at bedtime  dextrose 40% Gel 15 Gram(s) Oral once PRN  dextrose 50% Injectable 12.5 Gram(s) IV Push once  dextrose 50% Injectable 25 Gram(s) IV Push once  dextrose 50% Injectable 25 Gram(s) IV Push once  glucagon  Injectable 1 milliGRAM(s) IntraMuscular once PRN  insulin lispro (HumaLOG) corrective regimen sliding scale   SubCutaneous Before meals and at bedtime  levothyroxine 50 MICROGram(s) Oral daily  dextrose 5%. 1000 milliLiter(s) IV Continuous <Continuous>  rivaroxaban 20 milliGRAM(s) Oral with dinner      [PHYSICAL EXAM:  TELEMETRY:  T(C): 36.2 (07-29-20 @ 09:12), Max: 36.7 (07-28-20 @ 19:59)  HR: 95 (07-29-20 @ 08:48) (94 - 125)  BP: 143/92 (07-29-20 @ 08:48) (108/70 - 143/92)  RR: 20 (07-29-20 @ 08:48) (20 - 24)  SpO2: 98% (07-29-20 @ 08:48) (92% - 100%)  Wt(kg): --  I&O's Summary    28 Jul 2020 07:01  -  29 Jul 2020 07:00  --------------------------------------------------------  IN: 0 mL / OUT: 1575 mL / NET: -1575 mL    29 Jul 2020 07:01  -  29 Jul 2020 11:32  --------------------------------------------------------  IN: 180 mL / OUT: 300 mL / NET: -120 mL      Height (cm): 177.8 (07-29 @ 06:26)  Weight (kg): 131 (07-29 @ 06:26)  BMI (kg/m2): 41.4 (07-29 @ 06:26)  BSA (m2): 2.44 (07-29 @ 06:26)  Lemon:  Central/PICC/Mid Line:                                         Appearance: Obese man laying in hospital bed on 3L NC in NAD	  HEENT:   Normal oral mucosa,  EOMI	  Neck: + JVD  Cardiovascular: Irregularly irregular, tachy  Respiratory: Decreased BS to LLL, crackles to R base  Gastrointestinal:  Abdominal distention, NT, BS + x4  Skin: Scab to L shin (dime-size)  Extremities: 3+ pitting edema to B/L LE up to knee, 1+ pitting edema to posterior thigh  Vascular: DP/PT faint B/L  Neurologic: Non-focal  Psychiatry: A & O x 3, Mood & affect appropriate                          12.1   3.70  )-----------( 110      ( 29 Jul 2020 07:39 )             38.0     07-29    139  |  103  |  34<H>  ----------------------------<  161<H>  4.8   |  28  |  1.42<H>    Ca    9.4      29 Jul 2020 07:39  Mg     1.6     07-29    TPro  7.1  /  Alb  4.1  /  TBili  1.7<H>  /  DBili  x   /  AST  30  /  ALT  24  /  AlkPhos  141<H>  07-28  proBNP: Serum Pro-Brain Natriuretic Peptide: 3497 pg/mL (07-28 @ 16:43)  TSH: Thyroid Stimulating Hormone, Serum: 0.965 uIU/mL (07-29 @ 07:39)  PT/INR - ( 28 Jul 2020 16:43 )   PT: 31.7 sec;   INR: 2.77 Interventional Cardiology PA Adult Progress Note    Subjective Assessment:  Pt seen and evaluated at bedside. Feels uncomfortable 2/2 LE edema. SOB improving. Urinating a lot. Denies CP, dizziness, palpitations, N/V. abdominal pain. All other ROS otherwise negative except per subjective.   	  MEDICATIONS:  furosemide   Injectable 40 milliGRAM(s) IV Push every 12 hours  metoprolol succinate ER 50 milliGRAM(s) Oral every 12 hour  buDESOnide    Inhalation Suspension 0.25 milliGRAM(s) Inhalation every 12 hours  ipratropium    for Nebulization 500 MICROGram(s) Nebulizer every 6 hours PRN  levalbuterol Inhalation 0.63 milliGRAM(s) Inhalation every 6 hours PRN  pantoprazole    Tablet 40 milliGRAM(s) Oral before breakfast  atorvastatin 40 milliGRAM(s) Oral at bedtime  dextrose 40% Gel 15 Gram(s) Oral once PRN  dextrose 50% Injectable 12.5 Gram(s) IV Push once  dextrose 50% Injectable 25 Gram(s) IV Push once  dextrose 50% Injectable 25 Gram(s) IV Push once  glucagon  Injectable 1 milliGRAM(s) IntraMuscular once PRN  insulin lispro (HumaLOG) corrective regimen sliding scale   SubCutaneous Before meals and at bedtime  levothyroxine 50 MICROGram(s) Oral daily  dextrose 5%. 1000 milliLiter(s) IV Continuous <Continuous>  rivaroxaban 20 milliGRAM(s) Oral with dinner      [PHYSICAL EXAM:  TELEMETRY:  T(C): 36.2 (07-29-20 @ 09:12), Max: 36.7 (07-28-20 @ 19:59)  HR: 95 (07-29-20 @ 08:48) (94 - 125)  BP: 143/92 (07-29-20 @ 08:48) (108/70 - 143/92)  RR: 20 (07-29-20 @ 08:48) (20 - 24)  SpO2: 98% (07-29-20 @ 08:48) (92% - 100%)  Wt(kg): --  I&O's Summary    28 Jul 2020 07:01  -  29 Jul 2020 07:00  --------------------------------------------------------  IN: 0 mL / OUT: 1575 mL / NET: -1575 mL    29 Jul 2020 07:01  -  29 Jul 2020 11:32  --------------------------------------------------------  IN: 180 mL / OUT: 300 mL / NET: -120 mL      Height (cm): 177.8 (07-29 @ 06:26)  Weight (kg): 131 (07-29 @ 06:26)  BMI (kg/m2): 41.4 (07-29 @ 06:26)  BSA (m2): 2.44 (07-29 @ 06:26)  Lemon:  Central/PICC/Mid Line:                                         Appearance: Obese man laying in hospital bed on 3L NC in NAD	  HEENT:   Normal oral mucosa,  EOMI	  Neck: + JVD  Cardiovascular: Irregularly irregular, tachy  Respiratory: Decreased BS to LLL, crackles to R base  Gastrointestinal:  Abdominal ascites, NT, BS + x4  Skin: Scab to L shin (dime-size)  Extremities: 3+ pitting edema to B/L LE up to knee, 1+ pitting edema to posterior thigh  Vascular: DP/PT faint B/L  Neurologic: Non-focal  Psychiatry: A & O x 3, Mood & affect appropriate                          12.1   3.70  )-----------( 110      ( 29 Jul 2020 07:39 )             38.0     07-29    139  |  103  |  34<H>  ----------------------------<  161<H>  4.8   |  28  |  1.42<H>    Ca    9.4      29 Jul 2020 07:39  Mg     1.6     07-29    TPro  7.1  /  Alb  4.1  /  TBili  1.7<H>  /  DBili  x   /  AST  30  /  ALT  24  /  AlkPhos  141<H>  07-28  proBNP: Serum Pro-Brain Natriuretic Peptide: 3497 pg/mL (07-28 @ 16:43)  TSH: Thyroid Stimulating Hormone, Serum: 0.965 uIU/mL (07-29 @ 07:39)  PT/INR - ( 28 Jul 2020 16:43 )   PT: 31.7 sec;   INR: 2.77

## 2020-07-29 NOTE — PROGRESS NOTE ADULT - PROBLEM SELECTOR PLAN 7
- Total chol 118, HDL 41, LDL 65  - c/w Atorvastatin 40 mg PO QD    PT consulted  DVT PPx: Xarelto  Dispo: EP consult, diuresis - Total chol 118, HDL 41, LDL 65  - c/w Atorvastatin 40 mg PO QD    DVT PPx: Xarelto  Dispo: EP consult, diuresis, PT consult, possible ischemic w/u this admit    Case d/w Dr. Cali

## 2020-07-29 NOTE — PROGRESS NOTE ADULT - ASSESSMENT
81 yo male with PMHx of HTN, HLD, DM, afib/flutter (hx of ablation/DCCV, w/recurrent afib/flutter refusing subsequent ablations at this time, on Xarelto), asthma (no intubations), hypothyroid who presents to [facility] ED 07/28/2020 c/o LE edema and palpitations and found to be in acute CHF exacerbation in setting of Aflutter with RVR, currently undergoing IV diuresis and diuresing well, d-dimer 774, CT PE could not r/o lobar or subsegmental PE, plan for LE venous duplex/VQ scan, EP consulted for management of AF with RVR, continued on Xarelto for AC. 79 yo male with PMHx of HTN, HLD, DM, afib/flutter (hx of ablation/DCCV, w/recurrent afib/flutter refusing subsequent ablations at this time, on Xarelto), asthma (no intubations), hypothyroid who presents to [facility] ED 07/28/2020 c/o LE edema and palpitations and found to be in new-onset acute CHF exacerbation in setting of Aflutter with RVR, TTE pending, currently receiving IV diuresis and diuresing well, d-dimer 774, CT PE could not r/o lobar or subsegmental PE, plan for LE venous duplex/VQ scan, EP consulted for management of AF with RVR, continued on Xarelto for AC. 79 yo male with PMHx of HTN, HLD, DM, afib/flutter (hx of ablation/DCCV, w/recurrent afib/flutter refusing subsequent ablations at this time, on Xarelto), asthma (no intubations), hypothyroid who presents to [facility] ED 07/28/2020 c/o LE edema and palpitations and found to be in new-onset acute CHF exacerbation in setting of Aflutter with RVR, TTE pending, currently receiving IV diuresis and diuresing well, saturating well on 3L NC, d-dimer 774, CT PE could not r/o lobar or subsegmental PE, plan for LE venous duplex/VQ scan, EP consulted for AF with RVR (currently rate controlled 80s since Toprol reinitiated), continued on Xarelto for AC for now, possible plan for ischemic w/u this admission when more euvolemic & pending ECHO/EP recommendations.

## 2020-07-29 NOTE — CHART NOTE - NSCHARTNOTEFT_GEN_A_CORE
Admitting Diagnosis:   Patient is a 80y old  Male who presents with a chief complaint of LE edema (2020 07:45)      Consult: Yes [   ]  No [   ]    Reason for Initial Nutrition Assessment:      PAST MEDICAL & SURGICAL HISTORY:  Uncomplicated asthma, unspecified asthma severity  History of hypothyroidism  DM (diabetes mellitus screen)  Essential hypertension  Atrial fibrillation and flutter  S/P AV kishan ablation  History of cardioversion: ,       Current Nutrition Order:    PO Intake: Good (%) [   ]  Fair (50-75%) [   ] Poor (<25%) [   ]    GI Issues:     Pain:    Skin Integrity:  no pressure ulcers  4+ Edema (left leg;  right leg;  left ankle;  right ankle;  left foot;  right foot)     Labs:       139  |  103  |  34<H>  ----------------------------<  161<H>  4.8   |  28  |  1.42<H>    Ca    9.4      2020 07:39  Mg     1.6         TPro  7.1  /  Alb  4.1  /  TBili  1.7<H>  /  DBili  x   /  AST  30  /  ALT  24  /  AlkPhos  141<H>      CAPILLARY BLOOD GLUCOSE      POCT Blood Glucose.: 162 mg/dL (2020 06:51)    Nutritionally Pertinent Lab Values:    Medications:  MEDICATIONS  (STANDING):  atorvastatin 40 milliGRAM(s) Oral at bedtime  dextrose 5%. 1000 milliLiter(s) (50 mL/Hr) IV Continuous <Continuous>  dextrose 50% Injectable 12.5 Gram(s) IV Push once  dextrose 50% Injectable 25 Gram(s) IV Push once  dextrose 50% Injectable 25 Gram(s) IV Push once  furosemide   Injectable 40 milliGRAM(s) IV Push every 12 hours  insulin lispro (HumaLOG) corrective regimen sliding scale   SubCutaneous Before meals and at bedtime  levothyroxine 50 MICROGram(s) Oral daily  metoprolol succinate ER 50 milliGRAM(s) Oral every 12 hours  pantoprazole    Tablet 40 milliGRAM(s) Oral before breakfast  rivaroxaban 20 milliGRAM(s) Oral with dinner    MEDICATIONS  (PRN):  dextrose 40% Gel 15 Gram(s) Oral once PRN Blood Glucose LESS THAN 70 milliGRAM(s)/deciliter  glucagon  Injectable 1 milliGRAM(s) IntraMuscular once PRN Glucose LESS THAN 70 milligrams/deciliter      Admitted Anthropometrics:    Weight:  Daily Height in cm: 177.8 (2020 06:26)    Daily Weight in k (2020 06:26)    Weight Change:     Nutrition Focused Physical Exam: Completed [   ]  Unable to complete [   ]  Muscle Wasting:  Subcutaneous Fat Wasting:    Estimated energy needs:     Subjective:   79 y/o male former smoker with PMHx of afib/flutter (hx of ablation/DCCV, recurrent afib/flutter), asthma (no intubations), HTN, hypothyroidism, DM, HLD who presents to [facility] ED 2020 c/o LE edema, SOB, and palpitations. Patient c/o 6 weeks of worsening LE edema, and abdominal distension. Pt now admitted to [facility] for management of fluid overload in the setting of rapid aflutter; 3+ pitting edema bilaterally when admitted.    Nutrition Diagnosis:    [  ] No active nutrition diagnosis at this time  [  ] Current medical condition precludes nutrition intervention    Goal:    Recommendations:    Education:     Risk Level: High [   ] Moderate [   ] Low [   ] Admitting Diagnosis:   Patient is a 80y old  Male who presents with a chief complaint of LE edema (29 Jul 2020 07:45)      Consult: Yes [   ]  No [  x ]    Reason for Initial Nutrition Assessment: LOS       PAST MEDICAL & SURGICAL HISTORY:  Uncomplicated asthma, unspecified asthma severity  History of hypothyroidism  DM (diabetes mellitus screen)  Essential hypertension  Atrial fibrillation and flutter  S/P AV kishan ablation  History of cardioversion: 2014, 2016      Current Nutrition Order:  DASH TLC Consistent Carbohydrate with evening snack 1000ml FR    PO Intake: Good (%) [ x  ]  Fair (50-75%) [   ] Poor (<25%) [   ]    GI Issues:   BM 7/28    Pain:  none per flow sheets     Skin Integrity:  no pressure ulcers  4+ Edema (left leg;  right leg;  left ankle;  right ankle;  left foot;  right foot)     Labs:   07-29    139  |  103  |  34<H>  ----------------------------<  161<H>  4.8   |  28  |  1.42<H>    Ca    9.4      29 Jul 2020 07:39  Mg     1.6     07-29    TPro  7.1  /  Alb  4.1  /  TBili  1.7<H>  /  DBili  x   /  AST  30  /  ALT  24  /  AlkPhos  141<H>  07-28    CAPILLARY BLOOD GLUCOSE      POCT Blood Glucose.: 162 mg/dL (29 Jul 2020 06:51)    Nutritionally Pertinent Lab Values:  BUN/Cr 34/1.42  Glucose 161  A1c 6.1%  Lipids reviewed     Medications:  MEDICATIONS  (STANDING):  atorvastatin 40 milliGRAM(s) Oral at bedtime  dextrose 5%. 1000 milliLiter(s) (50 mL/Hr) IV Continuous <Continuous>  dextrose 50% Injectable 12.5 Gram(s) IV Push once  dextrose 50% Injectable 25 Gram(s) IV Push once  dextrose 50% Injectable 25 Gram(s) IV Push once  furosemide   Injectable 40 milliGRAM(s) IV Push every 12 hours  insulin lispro (HumaLOG) corrective regimen sliding scale   SubCutaneous Before meals and at bedtime  levothyroxine 50 MICROGram(s) Oral daily  metoprolol succinate ER 50 milliGRAM(s) Oral every 12 hours  pantoprazole    Tablet 40 milliGRAM(s) Oral before breakfast  rivaroxaban 20 milliGRAM(s) Oral with dinner    MEDICATIONS  (PRN):  dextrose 40% Gel 15 Gram(s) Oral once PRN Blood Glucose LESS THAN 70 milliGRAM(s)/deciliter  glucagon  Injectable 1 milliGRAM(s) IntraMuscular once PRN Glucose LESS THAN 70 milligrams/deciliter      Admitted Anthropometrics:    5'10''  pounds+-10%  7/29 288.8 pounds  BMI 41.4  %WBS=533    **BMI note not placed at this time as pt with edema, will attempt to place on F/U Pending Dry wt     Weight Change: Based on most recent EMR wt     Nutrition Focused Physical Exam: Completed [   ]  Unable to complete [   ]- NA     Estimated energy needs:   IBW used to calculate energy needs due to pt's current body weight exceeding 120% of IBW  Adjusted based on age, CHF, Renal, Fluids per team  ~1900kcal/day 25kcal/kg  ~90-105gm/day 1.2-1.4gm/kg (0.7-0.8gm/kg/ABW)    Subjective:   79 y/o male former smoker with PMHx of afib/flutter (hx of ablation/DCCV, recurrent afib/flutter), asthma (no intubations), HTN, hypothyroidism, DM, HLD who presents to [facility] ED 07/28/2020 c/o LE edema, SOB, and palpitations. Patient c/o 6 weeks of worsening LE edema, and abdominal distension. Pt now admitted to [facility] for management of fluid overload in the setting of rapid aflutter; 3+ pitting edema bilaterally when admitted. Per IDR, CHF exacerbation.  Spoke with RN + Attended IDR today. Pt visited, alert sitting in chair, seen consuming breakfast, renay 100% consumed; pt on Consistent Carbohydrate with evening snack DASH TLC, 1000ml FR (POCT 162). Pt reports wife does cooking PTA, does not use salt, eats mostly fresh food, does have some canned items, eats sandwiches, likes pickles. Good PO intake PTA. No issues chewing/swallowing. Pt reports UBW of 156 pounds x9 weeks ago, feels he may have gained wt, likely d/t fluids. Pt unsure how much pt he has gained, admit wt of 288 pounds noted - ? accuracy of stated UBW.  Please see below for nutritions recommendations. Recs made with team.      Nutrition Diagnosis: Obesity RT presumed intake<EER + fluid retention in setting of CHF exacerbation AEB BMI 41.4   Goal: pt will have safe gradual wt loss upon d/c of 0.5-1.0 pounds/week to reach IBW     Recommendations:  1. DASH TLC, Consistent Carbohydrate; FR per team.  2. Monitor %PO intake, Diet tolerance.  3. Monitor Skin, Wts Daily, GI, GOC, Labs.  4. RD to remain available for additional nutrition interventions as needed.     Education: RN reports pt with off affect and tends to get overwhelmed with education - Discussed Choosing low salt foods, following MD determined fluid restrictions. Reviewed foods high/low in salt. Unclear understanding based on pt affect. Declined written materials.     Risk Level: High [   ] Moderate [  x ] Low [   ]

## 2020-07-29 NOTE — PROGRESS NOTE ADULT - PROBLEM SELECTOR PLAN 6
-f/u LDL  -continue atorvastatin 40mg QD    PT consulted  DVT PPx: Xarelto  Dispo: EP consult, diuresis - Dr. Mancuso following  - c/w Atrovent/Xopenex q6hr PRN  - Started Budesonide 0.25 BID as per Dr. Mancuso recommendations

## 2020-07-29 NOTE — PROGRESS NOTE ADULT - PROBLEM SELECTOR PLAN 3
-unknown baseline, Cr. 1.53  -holding ACE/ avoid nephrotoxic agents   -continue to monitor Possibly cardiorenal, unknown baseline, Cr. 1.53>1.42  - Holding home HCTZ 50 mg PO QD  - Awaiting results of ECHO prior to initiation of ACEi/ARB  - Avoid nephrotoxic agents

## 2020-07-29 NOTE — PROGRESS NOTE ADULT - SUBJECTIVE AND OBJECTIVE BOX
81 y/o male aflutter cardiomyopathy fluid overload hx ablation 2017 dr rothman on xarelto beta blocker ace diuretics hemodynamically stable lungs decreased breath sounds bases heart s1s2 2/6 holosystolic apical murmur bp 130/80 echo ordered ep to see re ablation discussed with all concerned

## 2020-07-29 NOTE — PROGRESS NOTE ADULT - PROBLEM SELECTOR PLAN 2
-No hx of CHF per patient   -3+ pitting edema bilaterally, JVD, decreased BS LLL, on admission satting 92% on RA, O2 sat 100% on 4L NC, BNP 3497, given IV Lasix 20mg x1 stat on admission   --given Duonebs x1 and IV solu-medrol 125mg x1 in ED  -CXR wet read left sided pleural effusion, vascular congestion, cardiomegaly, f/u official  -started IV Lasix 20mg BID  -core measures ordered, 1L fluid restrictions, strict I/Os, daily weights   -Echo and LE duplex ordered f/u, f/u D-dimer  -Blood Cx sent in ED f/u 3+ pitting edema to B/L LE (up to knees), + 1 pitting edema to posterior thighs, + JVD, saturating well on 3L NC, net neg 1.6 L since admit  - BNP 3497  - CT PE 7/28: increased LLL and lingular consolidation and new dependent groundglass airspace opacity with unchanged elevated L hemidiaphragm, possibly atelectasis, cannot exclude infection;  new small L pleural effusion; few prominent mediastinal nodes, nonspecific, possibly inflammatory or infectious; 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  -core measures ordered, 1L fluid restrictions, strict I/Os, daily weights   -Echo and LE duplex ordered f/u, f/u D-dimer  - c/w Lasix 40 mg IV BID as d/w Dr. Gomez  -Blood Cx sent in ED f/u 3+ pitting edema to B/L LE (up to knees), + 1 pitting edema to posterior thighs, + JVD, saturating well on 3L NC, net neg 1.6 L since admit  - BNP 3497  - CT PE 7/28: increased LLL and lingular consolidation and new dependent groundglass airspace opacity with unchanged elevated L hemidiaphragm, possibly atelectasis, cannot exclude infection;  new small L pleural effusion; few prominent mediastinal nodes, nonspecific, possibly inflammatory or infectious; 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  - ECHO ordered (f/u results)  - c/w Lasix 40 mg IV BID as d/w Dr. Gomez  - c/w Toprol XL 50 mg PO BID. Not on ACEi/ARB currently, f/u ECHO results  - Core measures ordered, 1L fluid restrictions, strict I/Os, daily weights 3+ pitting edema to B/L LE (up to knees), + 1 pitting edema to posterior thighs, + JVD, saturating well on 3L NC, net neg 1.6 L since admit  - BNP 3497  - CT PE 7/28: increased LLL and lingular consolidation and new dependent groundglass airspace opacity with unchanged elevated L hemidiaphragm, possibly atelectasis, cannot exclude infection;  new small L pleural effusion; few prominent mediastinal nodes, nonspecific, possibly inflammatory or infectious; 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  - ECHO ordered (f/u results)  - c/w Lasix 40 mg IV BID as d/w Dr. Cali  - c/w Toprol XL 50 mg PO BID. Not on ACEi/ARB currently, f/u ECHO results  - Core measures ordered, 1L fluid restrictions, strict I/Os, daily weights

## 2020-07-29 NOTE — PROGRESS NOTE ADULT - PROBLEM SELECTOR PLAN 1
-On arrival to ED, patient HR 120s in aflutter, currently patient rate controlled HR 80-90s  -EKG on admission: aflutter @ 123bpm, RBBB, Trops x1 0.08, f/u trops @ 11pm and AM  -BB on hold 2/2 fluid overload, continue home xarelto  -EP consult in AM (patient follows up with Dr. Amaya)  -f/u TSH Remains in Aflutter, avg VR 120s overnight, known to Dr. Tony/Dr. Sprague  - Trop 0.08>0.03>0.04 likely in setting of Aflutter with RVR  - EKG 7/28: Aflutter @ 123bpm, RBBB  - D-dimer 774, CT PE protocol 7/28: no central PE but cannot assess for lobar/segmental/subsegmental branches 2/2 artifact from respiratory motion and suboptimal contrast bolus timing.   - Dr. Mancuso consulted, f/u LE venous duplex & VQ scan (ordered)  - Takes Toprol  mg PO QD at home, start Toprol XL 50 mg PO BID as d/w Dr. Gomez  - c/w home Xarelto 20 mg qHS for AC Remains in Aflutter, avg VR 120s overnight, currently rate controlled in 80s, h/o previous DCCV/ablation, known to Dr. Tony/Dr. Sprague  - Trop 0.08>0.03>0.04 likely in setting of Aflutter with RVR  - EKG 7/28: Aflutter @ 123bpm, RBBB  - D-dimer 774, CT PE protocol 7/28: no central PE but cannot assess for lobar/segmental/subsegmental branches 2/2 artifact from respiratory motion and suboptimal contrast bolus timing.   - Dr. Mancuso consulted, f/u LE venous duplex & VQ scan to r/o PE (ordered)  - TSH WNL. BCx sent in ED, f/u  - Possible ischemic w/u when more euvolemic, pending ECHO/EP recommendations  - EP consulted, f/u reccs  - Takes Toprol  mg PO QD at home, start Toprol XL 50 mg PO BID as d/w Dr. Gomez  - c/w home Xarelto 20 mg qHS for AC, will need transition to heparin gtt if plan for ischemic w/u this admission.

## 2020-07-29 NOTE — PROGRESS NOTE ADULT - SUBJECTIVE AND OBJECTIVE BOX
CC/ HPI Patient is a 80 year old male with asthma, no hospital visits, history of atrial fib/flutter,  ablation,  w/recurrent atrial fib/flutter refusing subsequent ablations, on Xarelto, who was admitted with worsening LE edema and palpitations, seen this morning denies acute respiratory complaint.           PAST MEDICAL & SURGICAL HISTORY:  Uncomplicated asthma, unspecified asthma severity  History of hypothyroidism  DM (diabetes mellitus screen)  Essential hypertension  Atrial fibrillation and flutter  S/P AV kishan ablation  History of cardioversion: 2014, 2016    SOCHX:  - tobacco,  -  alcohol        FAMILY HISTORY: FA/MO  - contributory   No pertinent family history in first degree relatives    ROS reviewed below with positive findings marked (+) :  GEN:  fever, chills ENT: tracheostomy,   epistaxis,  sinusitis COR: CAD, CHF,  +HTN, +dysrhythmia PUL: COPD, ILD, +asthma,+ pneumonia GI: PEG, dysphagia, hemorrhage, other ERICKA: kidney disease, electrolyte disorder HEM:  anemia, thrombus, coagulopathy, cancer ENDO:  thyroid disease, +diabetes mellitus CNS:  dementia, stroke, seizure, PSY:  depression, anxiety, other      MEDICATIONS  (STANDING):  atorvastatin 40 milliGRAM(s) Oral at bedtime  furosemide   Injectable 40 milliGRAM(s) IV Push every 12 hours  insulin lispro (HumaLOG) corrective regimen sliding scale   SubCutaneous Before meals and at bedtime  levothyroxine 50 MICROGram(s) Oral daily  metoprolol succinate ER 50 milliGRAM(s) Oral every 12 hours  pantoprazole    Tablet 40 milliGRAM(s) Oral before breakfast  rivaroxaban 20 milliGRAM(s) Oral with dinner    MEDICATIONS  (PRN):  dextrose 40% Gel 15 Gram(s) Oral once PRN Blood Glucose LESS THAN 70 milliGRAM(s)/deciliter  glucagon  Injectable 1 milliGRAM(s) IntraMuscular once PRN Glucose LESS THAN 70 milligrams/deciliter      Vital Signs Last 24 Hrs  T(C): 36.2 (29 Jul 2020 09:12), Max: 36.7 (28 Jul 2020 19:59)  T(F): 97.2 (29 Jul 2020 09:12), Max: 98.1 (28 Jul 2020 19:59)  HR: 95 (29 Jul 2020 08:48) (94 - 125)  BP: 143/92 (29 Jul 2020 08:48) (108/70 - 143/92)  BP(mean): 112 (29 Jul 2020 08:48) (76 - 112)  RR: 20 (29 Jul 2020 08:48) (20 - 24)  SpO2: 98% (29 Jul 2020 08:48) (92% - 100%)    GENERAL:         comfortable,  - distress.  HEENT:            - trauma,  - icterus,  - injection,  - nasal discharge.  NECK:              - jugular venous distention, - thyromegaly.  LYMPH:           - lymphadenopathy, - masses.  RESP:              + crackles,   - rhonchi,   - wheezes.   COR:                S1S2   - gallops,  - rubs.  ABD:                bowel sounds,   soft, - tender, - distended  EXT/MSC:         - cyanosis,  - clubbing, ++ edema.    NEURO:             alert and oriented                          12.1   3.70  )-----------( 110      ( 29 Jul 2020 07:39 )             38.0     07-29    139  |  103  |  34<H>  ----------------------------<  161<H>  4.8   |  28  |  1.42<H>        CT Angio Chest PE Protocol w/ IV Cont (07.28.20)   No central pulmonary embolism. Cannot assess for lobar, segmental or subsegmental branches due to artifact from respiratory motion and suboptimal contrast bolus timing. Unchanged prominent right main pulmonary artery measuring up to 3.6 cm of unclear clinical significance. Two-vessel configuration of the aortic arch. Mild calcified plaque aorta. Mild aortic root calcification. Cardiomegaly. Moderate coronary artery calcification. No pericardial effusion is seen.    Few prominent mediastinal nodes,  *  Para-aortic, 1.0 x 1.0 cm  *  Right upper paratracheal, posterior 1.5 x 1.0 cm,    Small left pleural effusions is seen. Left lower lobe consolidation with air bronchograms and adjacent compressive atelectasis. Trace right lower lobe linear atelectasis.    Limited evaluation of the upper abdomen demonstrates small amount of ascites.    Evaluation of the soft tissues demonstrates bilateral symmetric gynecomastia.    Evaluation of the osseous structures demonstrates degenerative changes in the spine.      IMPRESSION:    1.  No central pulmonary embolism. Cannot assess for lobar, segmental or subsegmental branches due to artifact from respiratory motion and suboptimal contrast bolus timing.  2.  Since November 9, 2016, increased left lower lobe and lingular consolidation and new dependent groundglass airspace opacity with unchanged elevated left hemidiaphragm, possibly atelectasis, cannot exclude infection.  3.  New small left pleural effusion.  4.  Few prominent mediastinal nodes, nonspecific, possibly inflammatory or infectious.        ASSESSMENT/PLAN    1) Atrial fibrillation/flutter  2) Cardiomyopathy  3) Asthma  4) Atelectasis    Oxygen as needed for a satisfactory SpO2  Bronchodilators:  Atrovent/ albuterol q 4 – 6 hours as needed  Corticosteroids: Add inhaled corticosteroid such as budesonide  Cardiac/HTN: echocardiogram, diuresis as needed  EP management  GI: Rx/ prophylaxis c PPI/H2B  Heme: Rx/VT receiving AC  Duplex bilateral lower extremities  Discussed with Dr. Cali

## 2020-07-29 NOTE — CONSULT NOTE ADULT - ASSESSMENT
80 year old male w/ hx of cardiomyopathy, HTN, recurrent a-fib/flutter (s/p ablations, DCCV), Asthma admitted to cardiology telemetry for CHF exacerbation, volume overload, requiring IV diuresis. Electrophysiology consulted for recurrent atrial fibrillation/ flutter. Patient has been reluctant to subsequent intervention for rhythm control. He is currently rate controlled on Toprol 50mg q12h and anticoagulated w/ Xarelto 20mg qhs.     Note Incomplete 80 year old male w/ hx of cardiomyopathy, HTN, recurrent a-fib/flutter (s/p ablations, DCCV), Asthma admitted to cardiology telemetry for CHF exacerbation, volume overload, requiring IV diuresis. Electrophysiology consulted for recurrent atrial fibrillation/ flutter. He is currently rate controlled on Toprol 50mg q12h and anticoagulated w/ Xarelto 20mg qhs. Though he has been reluctant to proceed with subsequent ablations prior to admission, he would likely benefit from cardioversion prior to discharge to remain in sinus rhythm.     Plan:   - Continue with Xarleto 20qhs and Toprol 50mg q12h  - Will discuss possible DCCV w/ patient   - Continue IV diuresis for volume control     Discussed w/ EP     Virgil Diaz MD  Cardiology Fellow

## 2020-07-30 ENCOUNTER — TRANSCRIPTION ENCOUNTER (OUTPATIENT)
Age: 80
End: 2020-07-30

## 2020-07-30 LAB
ANION GAP SERPL CALC-SCNC: 15 MMOL/L — SIGNIFICANT CHANGE UP (ref 5–17)
BUN SERPL-MCNC: 43 MG/DL — HIGH (ref 7–23)
CALCIUM SERPL-MCNC: 9.7 MG/DL — SIGNIFICANT CHANGE UP (ref 8.4–10.5)
CHLORIDE SERPL-SCNC: 99 MMOL/L — SIGNIFICANT CHANGE UP (ref 96–108)
CK MB CFR SERPL CALC: 7.2 NG/ML — HIGH (ref 0–6.7)
CK SERPL-CCNC: 144 U/L — SIGNIFICANT CHANGE UP (ref 30–200)
CO2 SERPL-SCNC: 29 MMOL/L — SIGNIFICANT CHANGE UP (ref 22–31)
CREAT SERPL-MCNC: 1.59 MG/DL — HIGH (ref 0.5–1.3)
CULTURE RESULTS: NO GROWTH — SIGNIFICANT CHANGE UP
GLUCOSE BLDC GLUCOMTR-MCNC: 125 MG/DL — HIGH (ref 70–99)
GLUCOSE BLDC GLUCOMTR-MCNC: 128 MG/DL — HIGH (ref 70–99)
GLUCOSE BLDC GLUCOMTR-MCNC: 154 MG/DL — HIGH (ref 70–99)
GLUCOSE BLDC GLUCOMTR-MCNC: 175 MG/DL — HIGH (ref 70–99)
GLUCOSE SERPL-MCNC: 115 MG/DL — HIGH (ref 70–99)
HCT VFR BLD CALC: 40.8 % — SIGNIFICANT CHANGE UP (ref 39–50)
HGB BLD-MCNC: 12.9 G/DL — LOW (ref 13–17)
MAGNESIUM SERPL-MCNC: 2.1 MG/DL — SIGNIFICANT CHANGE UP (ref 1.6–2.6)
MCHC RBC-ENTMCNC: 31.6 GM/DL — LOW (ref 32–36)
MCHC RBC-ENTMCNC: 35.1 PG — HIGH (ref 27–34)
MCV RBC AUTO: 110.9 FL — HIGH (ref 80–100)
NRBC # BLD: 0 /100 WBCS — SIGNIFICANT CHANGE UP (ref 0–0)
PLATELET # BLD AUTO: 129 K/UL — LOW (ref 150–400)
POTASSIUM SERPL-MCNC: 4.5 MMOL/L — SIGNIFICANT CHANGE UP (ref 3.5–5.3)
POTASSIUM SERPL-SCNC: 4.5 MMOL/L — SIGNIFICANT CHANGE UP (ref 3.5–5.3)
RBC # BLD: 3.68 M/UL — LOW (ref 4.2–5.8)
RBC # FLD: 15.6 % — HIGH (ref 10.3–14.5)
SODIUM SERPL-SCNC: 143 MMOL/L — SIGNIFICANT CHANGE UP (ref 135–145)
SPECIMEN SOURCE: SIGNIFICANT CHANGE UP
TROPONIN T SERPL-MCNC: 0.05 NG/ML — CRITICAL HIGH (ref 0–0.01)
WBC # BLD: 6.17 K/UL — SIGNIFICANT CHANGE UP (ref 3.8–10.5)
WBC # FLD AUTO: 6.17 K/UL — SIGNIFICANT CHANGE UP (ref 3.8–10.5)

## 2020-07-30 PROCEDURE — 78582 LUNG VENTILAT&PERFUS IMAGING: CPT | Mod: 26

## 2020-07-30 PROCEDURE — 99232 SBSQ HOSP IP/OBS MODERATE 35: CPT

## 2020-07-30 RX ORDER — FUROSEMIDE 40 MG
1 TABLET ORAL
Qty: 60 | Refills: 3
Start: 2020-07-30 | End: 2020-11-26

## 2020-07-30 RX ORDER — METOPROLOL TARTRATE 50 MG
1 TABLET ORAL
Qty: 0 | Refills: 0 | DISCHARGE
Start: 2020-07-30

## 2020-07-30 RX ORDER — METOPROLOL TARTRATE 50 MG
1 TABLET ORAL
Qty: 0 | Refills: 0 | DISCHARGE

## 2020-07-30 RX ADMIN — Medication 40 MILLIGRAM(S): at 18:28

## 2020-07-30 RX ADMIN — Medication 50 MICROGRAM(S): at 05:42

## 2020-07-30 RX ADMIN — Medication 50 MILLIGRAM(S): at 18:28

## 2020-07-30 RX ADMIN — Medication 0.25 MILLIGRAM(S): at 05:41

## 2020-07-30 RX ADMIN — Medication 50 MILLIGRAM(S): at 05:42

## 2020-07-30 RX ADMIN — Medication 2: at 18:29

## 2020-07-30 RX ADMIN — Medication 40 MILLIGRAM(S): at 05:42

## 2020-07-30 RX ADMIN — RIVAROXABAN 20 MILLIGRAM(S): KIT at 18:29

## 2020-07-30 RX ADMIN — Medication 0.25 MILLIGRAM(S): at 18:28

## 2020-07-30 RX ADMIN — PANTOPRAZOLE SODIUM 40 MILLIGRAM(S): 20 TABLET, DELAYED RELEASE ORAL at 05:42

## 2020-07-30 RX ADMIN — ATORVASTATIN CALCIUM 40 MILLIGRAM(S): 80 TABLET, FILM COATED ORAL at 21:52

## 2020-07-30 NOTE — PROGRESS NOTE ADULT - SUBJECTIVE AND OBJECTIVE BOX
81 y/o male hx ablation afib/flutter recurrent a flutter fluuid overload on beta blocker xarelto diuretics with improvement seen by ep plan for dccv vs ablation hemodynamically improved lungs good air entry heart s1s2 2/6 holosystolic apical murmur bp 130/80 echo normal ef pa systolic 60 discussed with all concerned

## 2020-07-30 NOTE — DISCHARGE NOTE PROVIDER - HOSPITAL COURSE
79 yo male with PMHx of HTN, HLD, DM, afib/flutter (hx of ablation/DCCV, w/recurrent afib/flutter refusing subsequent ablations at this time, on Xarelto), asthma (no intubations), hypothyroid who presents to [facility] ED 07/28/2020 c/o LE edema and palpitations and found to be in new-onset acute CHF exacerbation in setting of Aflutter with RVR, TTE pending, currently receiving IV diuresis and diuresing well, saturating well on 3L NC, d-dimer 774, CT PE could not r/o lobar or subsegmental PE, plan for LE venous duplex/VQ scan, EP consulted for AF with RVR (currently rate controlled 80s since Toprol reinitiated), continued on Xarelto for AC for now, possible plan for ischemic w/u this admission when more euvolemic & pending ECHO/EP recommendations. 81 yo male with PMHx of HTN, HLD, DM, afib/flutter (hx of ablation/DCCV, w/recurrent afib/flutter refusing subsequent ablations at this time, on Xarelto), asthma (no intubations), hypothyroid who presents to [facility] ED 07/28/2020 c/o LE edema and palpitations and found to be in new-onset acute CHF exacerbation in setting of Aflutter with RVR, TTE pending, currently receiving IV diuresis and diuresing well, saturating well on 3L NC, d-dimer 774, CT PE could not r/o lobar or subsegmental PE, plan for LE venous duplex/VQ scan, EP consulted for AF with RVR (currently rate controlled 80s since Toprol reinitiated), continued on Xarelto for AC for now, possible plan for ischemic w/u this admission when more euvolemic & pending ECHO/EP recommendations. 81 yo male with PMHx of HTN, HLD, DM, afib/flutter (hx of ablation/DCCV, w/recurrent afib/flutter refusing subsequent ablations at this time, on Xarelto), asthma (no intubations), hypothyroid who presents to [facility] ED 07/28/2020 c/o LE edema and palpitations and found to be in new-onset acute CHF exacerbation in setting of Aflutter with RVR, TTE pending, currently receiving IV diuresis and diuresing well, saturating well on 3L NC, d-dimer 774, CT PE could not r/o lobar or subsegmental PE, plan for LE venous duplex/VQ scan, EP consulted for AF with RVR (currently rate controlled 80s since Toprol reinitiated), continued on Xarelto for AC for now, Echo showed normal LV size and function EF 55% but mildly dilated RV. Severely dilated RA. PASP 66mmHg. VQ scan performed on 07/30 and negative for PE. Pt was adequately diuresed and was stable for discharge as per Dr Cali. Pt was seen by Dr Joni Mancuso (pulmonology) and was recommended to follow up with him in 1 week to schedule an outpt sleep study. Pt was ambulated on day of discharge and did not desaturate while ambulating. Pt did desat to 86% on room air while sleeping. Pt was put on 2L NC while sleeping and O2 sat 96%. Home oxygen was set up and pt given a portable tank prior to discharge with  instructions on how to receive the oxygen tank at home. Pt wants to defer decision for possible ablation until he sees Dr Sprague in the office next week. Pt will follow up with Dr Gomez in 1 week.

## 2020-07-30 NOTE — PROGRESS NOTE ADULT - SUBJECTIVE AND OBJECTIVE BOX
Subjective:  Patient seen and examined at bedside.   Feeling better. On room air. No acute complaints.   Telemetry reviewed: atrial flutter, variable, rate controlled     PAST MEDICAL & SURGICAL HISTORY:  Uncomplicated asthma, unspecified asthma severity  History of hypothyroidism  DM (diabetes mellitus screen)  Essential hypertension  Atrial fibrillation and flutter  S/P AV kishan ablation  History of cardioversion: ,       MEDICATIONS  (STANDING):  atorvastatin 40 milliGRAM(s) Oral at bedtime  buDESOnide    Inhalation Suspension 0.25 milliGRAM(s) Inhalation every 12 hours  dextrose 5%. 1000 milliLiter(s) (50 mL/Hr) IV Continuous <Continuous>  dextrose 50% Injectable 12.5 Gram(s) IV Push once  dextrose 50% Injectable 25 Gram(s) IV Push once  dextrose 50% Injectable 25 Gram(s) IV Push once  furosemide   Injectable 40 milliGRAM(s) IV Push every 12 hours  insulin lispro (HumaLOG) corrective regimen sliding scale   SubCutaneous Before meals and at bedtime  levothyroxine 50 MICROGram(s) Oral daily  metoprolol succinate ER 50 milliGRAM(s) Oral every 12 hours  pantoprazole    Tablet 40 milliGRAM(s) Oral before breakfast  rivaroxaban 20 milliGRAM(s) Oral with dinner      Vital Signs Last 24 Hrs  T(C): 36.4 (2020 08:48), Max: 36.8 (2020 18:19)  T(F): 97.5 (2020 08:48), Max: 98.3 (2020 22:20)  HR: 72 (2020 08:39) (61 - 93)  BP: 129/63 (2020 08:39) (109/69 - 135/88)  BP(mean): 85 (2020 08:39) (85 - 93)  RR: 20 (2020 08:39) (18 - 22)  SpO2: 99% (2020 08:39) (96% - 99%)    Daily     Daily Weight in k.9 (2020 06:34)    Physical Exam:   GEN: NAD, AAOx3  HEENT: MMM, no icterus  CV: S1 S2 RRR, no MRG  Lung: CTAB  Abd: soft NT ND +BS  Ext: no c/c/e  Neuro: no focal neuro deficit      LABS:                        12.9   6.17  )-----------( 129      ( 2020 06:03 )             40.8     07-30    143  |  99  |  43<H>  ----------------------------<  115<H>  4.5   |  29  |  1.59<H>    Ca    9.7      2020 06:03  Mg     2.1     07-30    TPro  7.1  /  Alb  4.1  /  TBili  1.7<H>  /  DBili  x   /  AST  30  /  ALT  24  /  AlkPhos  141<H>  07-28    CARDIAC MARKERS ( 2020 06:03 )  x     / 0.05 ng/mL / 144 U/L / x     / 7.2 ng/mL  CARDIAC MARKERS ( 2020 07:39 )  x     / 0.04 ng/mL / x     / x     / x      CARDIAC MARKERS ( 2020 00:33 )  x     / 0.03 ng/mL / x     / x     / x      CARDIAC MARKERS ( 2020 16:43 )  x     / 0.08 ng/mL / 218 U/L / x     / x          PT/INR - ( 2020 16:43 )   PT: 31.7 sec;   INR: 2.77              I&O's Detail    2020 07:01  -  2020 07:00  --------------------------------------------------------  IN:    Oral Fluid: 360 mL  Total IN: 360 mL    OUT:    Voided: 2325 mL  Total OUT: 2325 mL    Total NET: -1965 mL      2020 07:01  -  2020 09:50  --------------------------------------------------------  IN:  Total IN: 0 mL    OUT:    Voided: 850 mL  Total OUT: 850 mL    Total NET: -850 mL              RADIOLOGY & ADDITIONAL STUDIES:  < from: TTE Echo Complete w/o Contrast w/ Doppler (20 @ 15:12) >  CONCLUSIONS:     1. Normal left ventricular size and function.   2. Mild symmetric left ventricular hypertrophy.   3. Mildly dilated right ventricular size.   4. Normal right ventricular systolic function.   5. Severely dilated right atrium.   6. Aortic sclerosis without significant stenosis.   7. Pulmonary hypertension present, pulmonary artery systolic pressure is 66 mmHg.   8. No pericardial effusion.    < end of copied text >

## 2020-07-30 NOTE — PROGRESS NOTE ADULT - PROBLEM SELECTOR PLAN 4
SBP 100s-140s  - c/w Toprol XL 50 mg PO BID, Lasix 40 mg IV BID  - Hold home HCTZ 50 mg PO QD for now

## 2020-07-30 NOTE — PROGRESS NOTE ADULT - SUBJECTIVE AND OBJECTIVE BOX
CC/ HPI Patient is a 80 year old male with asthma, no hospital visits, history of atrial fib/flutter,  ablation,  w/recurrent atrial fib/flutter refusing subsequent ablations, on Xarelto, who was admitted with worsening LE edema and palpitations, seen this morning denies acute respiratory complaint.           PAST MEDICAL & SURGICAL HISTORY:  Uncomplicated asthma, unspecified asthma severity  History of hypothyroidism  DM (diabetes mellitus screen)  Essential hypertension  Atrial fibrillation and flutter  S/P AV kishan ablation  History of cardioversion: 2014, 2016    SOCHX:  - tobacco,  -  alcohol        FAMILY HISTORY: FA/MO  - contributory   No pertinent family history in first degree relatives    ROS reviewed below with positive findings marked (+) :  GEN:  fever, chills ENT: tracheostomy,   epistaxis,  sinusitis COR: CAD, CHF,  +HTN, +dysrhythmia PUL: COPD, ILD, +asthma,+ pneumonia GI: PEG, dysphagia, hemorrhage, other ERICKA: kidney disease, electrolyte disorder HEM:  anemia, thrombus, coagulopathy, cancer ENDO:  thyroid disease, +diabetes mellitus CNS:  dementia, stroke, seizure, PSY:  depression, anxiety, other      MEDICATIONS  (STANDING):  atorvastatin 40 milliGRAM(s) Oral at bedtime  buDESOnide    Inhalation Suspension 0.25 milliGRAM(s) Inhalation every 12 hours  furosemide   Injectable 40 milliGRAM(s) IV Push every 12 hours  insulin lispro (HumaLOG) corrective regimen sliding scale   SubCutaneous Before meals and at bedtime  levothyroxine 50 MICROGram(s) Oral daily  metoprolol succinate ER 50 milliGRAM(s) Oral every 12 hours  pantoprazole    Tablet 40 milliGRAM(s) Oral before breakfast  rivaroxaban 20 milliGRAM(s) Oral with dinner    MEDICATIONS  (PRN):  dextrose 40% Gel 15 Gram(s) Oral once PRN Blood Glucose LESS THAN 70 milliGRAM(s)/deciliter  glucagon  Injectable 1 milliGRAM(s) IntraMuscular once PRN Glucose LESS THAN 70 milligrams/deciliter  ipratropium    for Nebulization 500 MICROGram(s) Nebulizer every 6 hours PRN Shortness of Breath and/or Wheezing  levalbuterol Inhalation 0.63 milliGRAM(s) Inhalation every 6 hours PRN SOB/wheezing      Vital Signs Last 24 Hrs  T(C): 37.1 (30 Jul 2020 13:36), Max: 37.1 (30 Jul 2020 13:36)  T(F): 98.8 (30 Jul 2020 13:36), Max: 98.8 (30 Jul 2020 13:36)  HR: 88 (30 Jul 2020 13:16) (61 - 88)  BP: 150/65 (30 Jul 2020 13:16) (109/69 - 150/65)  BP(mean): 93 (30 Jul 2020 13:16) (85 - 93)  RR: 20 (30 Jul 2020 13:16) (18 - 22)  SpO2: 94% (30 Jul 2020 13:16) (94% - 99%)    GENERAL:         comfortable,  - distress.  HEENT:            - trauma,  - icterus,  - injection,  - nasal discharge.  NECK:              - jugular venous distention, - thyromegaly.  LYMPH:           - lymphadenopathy, - masses.  RESP:               + clear,   - rales,   - rhonchi,   - wheezes.   COR:                S1S2   - gallops,  - rubs.  ABD:                bowel sounds,   soft, - tender, - distended.  EXT/MSC:         - cyanosis,  - clubbing, + edema.    NEURO:           + alert and oriented                             12.9   6.17  )-----------( 129      ( 30 Jul 2020 06:03 )             40.8     07-30    143  |  99  |  43<H>  ----------------------------<  115<H>  4.5   |  29  |  1.59<H>    Ca    9.7      30 Jul 2020 06:03  Mg     2.1     07-30            CT Angio Chest PE Protocol w/ IV Cont (07.28.20)   No central pulmonary embolism. Cannot assess for lobar, segmental or subsegmental branches due to artifact from respiratory motion and suboptimal contrast bolus timing. Unchanged prominent right main pulmonary artery measuring up to 3.6 cm of unclear clinical significance. Two-vessel configuration of the aortic arch. Mild calcified plaque aorta. Mild aortic root calcification. Cardiomegaly. Moderate coronary artery calcification. No pericardial effusion is seen.    Few prominent mediastinal nodes,  *  Para-aortic, 1.0 x 1.0 cm  *  Right upper paratracheal, posterior 1.5 x 1.0 cm,    Small left pleural effusions is seen. Left lower lobe consolidation with air bronchograms and adjacent compressive atelectasis. Trace right lower lobe linear atelectasis.    Limited evaluation of the upper abdomen demonstrates small amount of ascites.    Evaluation of the soft tissues demonstrates bilateral symmetric gynecomastia.    Evaluation of the osseous structures demonstrates degenerative changes in the spine.      IMPRESSION:    1.  No central pulmonary embolism. Cannot assess for lobar, segmental or subsegmental branches due to artifact from respiratory motion and suboptimal contrast bolus timing.  2.  Since November 9, 2016, increased left lower lobe and lingular consolidation and new dependent groundglass airspace opacity with unchanged elevated left hemidiaphragm, possibly atelectasis, cannot exclude infection.  3.  New small left pleural effusion.  4.  Few prominent mediastinal nodes, nonspecific, possibly inflammatory or infectious.        ASSESSMENT/PLAN    1) Atrial fibrillation/flutter  2) Cardiomyopathy  3) Asthma  4) Atelectasis  5) Pulmonary hypertension    Satisfactory SpO2  Bronchodilators:  Atrovent/ albuterol q 4 – 6 hours as needed  Corticosteroids: budesonide  Cardiac/HTN: diuresis as needed, EP management  GI: Rx/ prophylaxis c PPI/H2B  Heme: Rx/VT receiving AC  Discussed with Dr. Cali and the medical team

## 2020-07-30 NOTE — PROGRESS NOTE ADULT - ASSESSMENT
81 yo male with PMHx of HTN, HLD, DM, afib/flutter (hx of ablation/DCCV, w/recurrent afib/flutter refusing subsequent ablations at this time, on Xarelto), asthma (no intubations), hypothyroid who presents to [facility] ED 07/28/2020 c/o LE edema and palpitations and found to be in new-onset acute CHF exacerbation in setting of Aflutter with RVR, TTE pending, currently receiving IV diuresis and diuresing well, saturating well on 3L NC, d-dimer 774, CT PE could not r/o lobar or subsegmental PE, plan for LE venous duplex/VQ scan, EP consulted for AF with RVR (currently rate controlled 80s since Toprol reinitiated), continued on Xarelto for AC for now, Echo showed normal LV size and function EF 55% but mildly dilated RV. Severely dilated RA. PASP 66mmHg. VQ scan performed on 07/30. Pt wants to defer decision for possible ablation until he sees Dr Sprague in the office next week.

## 2020-07-30 NOTE — PROGRESS NOTE ADULT - PROBLEM SELECTOR PLAN 6
- Dr. Mancuso following  - c/w Atrovent/Xopenex q6hr PRN  - Started Budesonide 0.25 BID as per Dr. Mancuso recommendations

## 2020-07-30 NOTE — PROGRESS NOTE ADULT - PROBLEM SELECTOR PLAN 2
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  - ECHO ordered (f/u results)  - c/w Lasix 40 mg IV BID as d/w Dr. Cali  - c/w Toprol XL 50 mg PO BID. Not on ACEi/ARB currently, f/u ECHO results  - Core measures ordered, 1L fluid restrictions, strict I/Os, daily weights

## 2020-07-30 NOTE — PROGRESS NOTE ADULT - PROBLEM SELECTOR PLAN 7
- Total chol 118, HDL 41, LDL 65  - c/w Atorvastatin 40 mg PO QD    DVT PPx: Xarelto  Dispo: EP consult, diuresis, PT consult, possible ischemic w/u this admit    Case d/w Dr. Cali

## 2020-07-30 NOTE — PROGRESS NOTE ADULT - PROBLEM SELECTOR PLAN 3
Possibly cardiorenal, unknown baseline, Cr. 1.53>1.42  - Holding home HCTZ 50 mg PO QD  - Awaiting results of ECHO prior to initiation of ACEi/ARB  - Avoid nephrotoxic agents

## 2020-07-30 NOTE — PROGRESS NOTE ADULT - SUBJECTIVE AND OBJECTIVE BOX
Interventional Cardiology PA Adult Progress Note    CC: LE edema and shortness of breath  Subjective Assessment:    SOB improving when sitting, LE edema improving      MEDICATIONS:  furosemide   Injectable 40 milliGRAM(s) IV Push every 12 hours  metoprolol succinate ER 50 milliGRAM(s) Oral every 12 hours  buDESOnide    Inhalation Suspension 0.25 milliGRAM(s) Inhalation every 12 hours  ipratropium    for Nebulization 500 MICROGram(s) Nebulizer every 6 hours PRN  levalbuterol Inhalation 0.63 milliGRAM(s) Inhalation every 6 hours PRN  pantoprazole    Tablet 40 milliGRAM(s) Oral before breakfast  atorvastatin 40 milliGRAM(s) Oral at bedtime  dextrose 40% Gel 15 Gram(s) Oral once PRN  dextrose 50% Injectable 12.5 Gram(s) IV Push once  dextrose 50% Injectable 25 Gram(s) IV Push once  dextrose 50% Injectable 25 Gram(s) IV Push once  glucagon  Injectable 1 milliGRAM(s) IntraMuscular once PRN  insulin lispro (HumaLOG) corrective regimen sliding scale   SubCutaneous Before meals and at bedtime  levothyroxine 50 MICROGram(s) Oral daily  dextrose 5%. 1000 milliLiter(s) IV Continuous <Continuous>  rivaroxaban 20 milliGRAM(s) Oral with dinner    [PHYSICAL EXAM:  TELEMETRY: intermittent slow aflutter to 40s. Intermittently rapid to 130s (rarely)  T(C): 37.1 (07-30-20 @ 13:36), Max: 37.1 (07-30-20 @ 13:36)  HR: 88 (07-30-20 @ 13:16) (61 - 88)  BP: 150/65 (07-30-20 @ 13:16) (109/69 - 150/65)  RR: 20 (07-30-20 @ 13:16) (18 - 22)  SpO2: 94% (07-30-20 @ 13:16) (94% - 99%)  Wt(kg): --  I&O's Summary    29 Jul 2020 07:01  -  30 Jul 2020 07:00  --------------------------------------------------------  IN: 360 mL / OUT: 2325 mL / NET: -1965 mL    30 Jul 2020 07:01  -  30 Jul 2020 17:02  --------------------------------------------------------  IN: 0 mL / OUT: 1250 mL / NET: -1250 mL    Lemon: none  Central/PICC/Mid Line:  none                                     Appearance: Normal	  HEENT:   Normal oral mucosa, PERRL, EOMI	  Neck: Supple, + JVD/ - JVD; Carotid Bruit   Cardiovascular: Normal S1 S2, No JVD, No murmurs,   Respiratory: decreased breath sounds b/l bases. No wheezes, rhonchi  Gastrointestinal:  Soft, Non-tender, + BS	  Skin: pitting edema in abdomen. No rashes, No ecchymoses, No cyanosis.   Extremities: +3 pitting edema b/l lower extremities   Vascular: Peripheral pulses palpable 2+ bilaterally  Neurologic: Non-focal  Psychiatry: A & O x 3, Mood & affect appropriate    LABS:                     12.9   6.17  )-----------( 129      ( 30 Jul 2020 06:03 )             40.8     07-30    143  |  99  |  43<H>  ----------------------------<  115<H>  4.5   |  29  |  1.59<H>    Ca    9.7      30 Jul 2020 06:03  Mg     2.1     07-30      ASSESSMENT/PLAN:

## 2020-07-30 NOTE — DISCHARGE NOTE PROVIDER - NSDCMRMEDTOKEN_GEN_ALL_CORE_FT
atorvastatin 40 mg oral tablet: 1 tab(s) orally once a day  colchicine 0.6 mg oral tablet: 1 tab(s) orally once a day  enalapril 20 mg oral tablet: 1 tab(s) orally once a day  hydroCHLOROthiazide 50 mg oral tablet: 1 tab(s) orally once a day  levothyroxine 50 mcg (0.05 mg) oral capsule: 1 cap(s) orally once a day  metFORMIN 850 mg oral tablet: 1 tab(s) orally 2 times a day  Metoprolol Succinate  mg oral tablet, extended release: 1 tab(s) orally once a day  pantoprazole 40 mg oral delayed release tablet: 1 tab(s) orally once a day (before a meal)  Xarelto 20 mg oral tablet: 1 tab(s) orally once a day (in the evening) atorvastatin 40 mg oral tablet: 1 tab(s) orally once a day  levothyroxine 50 mcg (0.05 mg) oral capsule: 1 cap(s) orally once a day  metFORMIN 850 mg oral tablet: 1 tab(s) orally 2 times a day  metoprolol succinate 50 mg oral tablet, extended release: 1 tab(s) orally every 12 hours  pantoprazole 40 mg oral delayed release tablet: 1 tab(s) orally once a day (before a meal)  Xarelto 20 mg oral tablet: 1 tab(s) orally once a day (in the evening) Advair Diskus 250 mcg-50 mcg inhalation powder: 1 puff(s) inhaled 2 times a day   atorvastatin 40 mg oral tablet: 1 tab(s) orally once a day  furosemide 40 mg oral tablet: 1 tab(s) orally 2 times a day   levalbuterol 0.63 mg/3 mL inhalation solution: 3 milliliter(s) inhaled every 6 hours, As needed, SOB/wheezing  levothyroxine 50 mcg (0.05 mg) oral capsule: 1 cap(s) orally once a day  metFORMIN 850 mg oral tablet: 1 tab(s) orally 2 times a day  metoprolol succinate 50 mg oral tablet, extended release: 1 tab(s) orally every 12 hours  pantoprazole 40 mg oral delayed release tablet: 1 tab(s) orally once a day (before a meal)  Xarelto 20 mg oral tablet: 1 tab(s) orally once a day (in the evening)

## 2020-07-30 NOTE — DISCHARGE NOTE PROVIDER - CARE PROVIDER_API CALL
Shlomo Sprague)  Cardiac Electrophysiology  [address] [place] [address]  Phone: [phone]  Fax: [phone]  Follow Up Time:     Tha Cali  INTERNAL MEDICINE  [address] [address]  Phone: [phone]  Fax: [phone]  Follow Up Time: Shlomo Sprague)  Cardiac Electrophysiology  [address] [place] [address]  Phone: [phone]  Fax: [phone]  Follow Up Time:     Tha Cali  INTERNAL MEDICINE  [address] [address]  Phone: [phone]  Fax: [phone]  Follow Up Time:     Joni Mancuso  CRITICAL CARE MEDICINE  [address] [place] [address] [address]  [place] [address]  Phone: [phone]  Fax: [phone]  Follow Up Time:

## 2020-07-30 NOTE — DISCHARGE NOTE PROVIDER - CARE PROVIDERS DIRECT ADDRESSES
[email or web address],DirectAddress_Unknown [email or web address],DirectAddress_Unknown,DirectAddress_Unknown

## 2020-07-30 NOTE — PROGRESS NOTE ADULT - PROBLEM SELECTOR PLAN 1
Remains in Aflutter, avg VR 120s overnight, currently rate controlled in 80s, h/o previous DCCV/ablation, known to Dr. Tony/Dr. Sprague  - Trop 0.08>0.03>0.04 likely in setting of Aflutter with RVR  - EKG 7/28: Aflutter @ 123bpm, RBBB  - D-dimer 774, CT PE protocol 7/28: no central PE but cannot assess for lobar/segmental/subsegmental branches 2/2 artifact from respiratory motion and suboptimal contrast bolus timing.   - Dr. Mancuso consulted, LE venous duplex limited study but no DVT above the knees. VQ scan performed  - TSH WNL. BCx NGTD x 1 day  - EP consulted will follow up as outpt with Dr Sprague and further discuss ablation in the near future.  - Takes Toprol  mg PO QD at home, start Toprol XL 50 mg PO BID as d/w Dr. Gomez  - c/w home Xarelto 20 mg qHS for AC

## 2020-07-30 NOTE — DISCHARGE NOTE PROVIDER - PROVIDER TOKENS
PROVIDER:[TOKEN:[[id number]]],PROVIDER:[TOKEN:[[id number]]] PROVIDER:[TOKEN:[[id number]]],PROVIDER:[TOKEN:[[id number]]],PROVIDER:[TOKEN:[[id number]]]

## 2020-07-30 NOTE — PROGRESS NOTE ADULT - ASSESSMENT
80 year old male w/ hx of cardiomyopathy, HTN, recurrent a-fib/flutter (s/p ablations, DCCV), Asthma admitted to cardiology telemetry for CHF exacerbation, volume overload, requiring IV diuresis. Electrophysiology consulted for recurrent atrial fibrillation/ flutter. He is currently rate controlled on Toprol 50mg q12h and anticoagulated w/ Xarelto 20mg qhs.     Plan:   - Patient is to follow up with Dr. Sprague as outpatient in 1-2 weeks with plan for ablation. Please schedule him for an appointment prior to discharge.  - He is to continue Toprol XL 100mg daily and Xarelto 20mg daily at discharge     This plan was discussed w/ Dr. Viraj Diaz MD  Cardiology Fellow

## 2020-07-30 NOTE — DISCHARGE NOTE PROVIDER - NSDCCPCAREPLAN_GEN_ALL_CORE_FT
PRINCIPAL DISCHARGE DIAGNOSIS  Diagnosis: Atrial fibrillation and flutter  Assessment and Plan of Treatment: You came to the hospital with a rapid irregular heart rate called ATRIAL FIBRILLATION/FLUTTER. This is causing you to go into a HEART FAILURE exacerbation where you develop fluid in your lungs and legs. It is VERY IMPORTANT that you undergo an ABLATION with Dr IRMA BENZ as soon as possible. Please follow up with Dr IRMA BENZ.      SECONDARY DISCHARGE DIAGNOSES  Diagnosis: Asthma  Assessment and Plan of Treatment: Asthma PRINCIPAL DISCHARGE DIAGNOSIS  Diagnosis: Atrial fibrillation and flutter  Assessment and Plan of Treatment: You came to the hospital with a rapid irregular heart rate called ATRIAL FIBRILLATION/FLUTTER. This is causing you to go into a HEART FAILURE exacerbation where you develop fluid in your lungs and legs. It is VERY IMPORTANT that you undergo an ABLATION with Dr IRMA BENZ as soon as possible. Please follow up with Dr IRMA BENZ in 1 week to schedule your appointment.      SECONDARY DISCHARGE DIAGNOSES  Diagnosis: Acute diastolic congestive heart failure  Assessment and Plan of Treatment: Your heart was not able to process all the fluid in your body when your heart was going very quickly while you were in rapid Atrial fibrillation/Atrial flutter. START taking LASIX (FUROSEMIDE) 40mg twice a day as a diuretic/water pill, to help you get out all the extra fluid in your body. It is VERY IMPORTANT that you weigh yourself every day. If you gain more than 2 pounds in 1 day or 5 pounds in 1 week then you may need to have your LASIX (FUROSEMIDE) dose adjusted. If you develop leg swelling call your doctor and your LASIX (FUROSEMIDE) dose may need to be adjusted. If you develop shortness of breath please go to the nearest Emergency Room immediately. DO NOT DRINK MORE THAN 1.5 LITERS OF FLUID A DAY. DO NOT CONSUME MORE TAHN 2000MG (2G) OF SALT/SODIUM IN A DAY.    Diagnosis: DM (diabetes mellitus screen)  Assessment and Plan of Treatment: You have prediabetes. Your hemoglobin A1C 6.1%. Continue home Metformin.    Diagnosis: Hyperlipidemia  Assessment and Plan of Treatment: Continue taking Atorvastatin 40mg daily for cholesterol control.    Diagnosis: Asthma  Assessment and Plan of Treatment: Continue home Asthma medications PRINCIPAL DISCHARGE DIAGNOSIS  Diagnosis: Atrial fibrillation and flutter  Assessment and Plan of Treatment: You came to the hospital with a rapid irregular heart rate called ATRIAL FIBRILLATION/FLUTTER. This is causing you to go into a HEART FAILURE exacerbation where you develop fluid in your lungs and legs. It is VERY IMPORTANT that you undergo an ABLATION with Dr IRMA BENZ as soon as possible. Please follow up with Dr IRMA BENZ in 1 week to schedule your appointment.      SECONDARY DISCHARGE DIAGNOSES  Diagnosis: Asthma  Assessment and Plan of Treatment: You have Asthma. Continue taking ADVAIR 250mcg/50mcg 1 puff inhaled twice a day.    Diagnosis: Acute diastolic congestive heart failure  Assessment and Plan of Treatment: Your heart was not able to process all the fluid in your body when your heart was going very quickly while you were in rapid Atrial fibrillation/Atrial flutter. START taking LASIX (FUROSEMIDE) 40mg twice a day as a diuretic/water pill, to help you get out all the extra fluid in your body. It is VERY IMPORTANT that you weigh yourself every day. If you gain more than 2 pounds in 1 day or 5 pounds in 1 week then you may need to have your LASIX (FUROSEMIDE) dose adjusted. If you develop leg swelling call your doctor and your LASIX (FUROSEMIDE) dose may need to be adjusted. If you develop shortness of breath please go to the nearest Emergency Room immediately. DO NOT DRINK MORE THAN 1.5 LITERS OF FLUID A DAY. DO NOT CONSUME MORE TAHN 2000MG (2G) OF SALT/SODIUM IN A DAY. You need home oxygen when you sleep because your oxygen levels drop very low.    Diagnosis: DM (diabetes mellitus screen)  Assessment and Plan of Treatment: You have prediabetes. Your hemoglobin A1C 6.1%. Continue home Metformin.    Diagnosis: Hyperlipidemia  Assessment and Plan of Treatment: Continue taking Atorvastatin 40mg daily for cholesterol control. PRINCIPAL DISCHARGE DIAGNOSIS  Diagnosis: Atrial fibrillation and flutter  Assessment and Plan of Treatment: You came to the hospital with a rapid irregular heart rate called ATRIAL FIBRILLATION/FLUTTER. This is causing you to go into a HEART FAILURE exacerbation where you develop fluid in your lungs and legs. It is VERY IMPORTANT that you undergo an ABLATION with Dr IRMA BENZ as soon as possible. Please follow up with Dr IRMA BENZ in 1 week to schedule your appointment.      SECONDARY DISCHARGE DIAGNOSES  Diagnosis: Asthma  Assessment and Plan of Treatment: You have Asthma. Continue taking ADVAIR 250mcg/50mcg 1 puff inhaled twice a day. START taking XOPENEX (LEVALBUTEROL) 0.63ml inhaled every 6 hours as needed for shortness of breath. Call Dr Joni Mancuso's office to make an appointment in 1 week for further workup for your Asthma and to schedule a sleep study appointment.    Diagnosis: Acute diastolic congestive heart failure  Assessment and Plan of Treatment: Your heart was not able to process all the fluid in your body when your heart was going very quickly while you were in rapid Atrial fibrillation/Atrial flutter. START taking LASIX (FUROSEMIDE) 40mg twice a day as a diuretic/water pill, to help you get out all the extra fluid in your body. It is VERY IMPORTANT that you weigh yourself every day. If you gain more than 2 pounds in 1 day or 5 pounds in 1 week then you may need to have your LASIX (FUROSEMIDE) dose adjusted. If you develop leg swelling call your doctor and your LASIX (FUROSEMIDE) dose may need to be adjusted. If you develop shortness of breath please go to the nearest Emergency Room immediately. DO NOT DRINK MORE THAN 1.5 LITERS OF FLUID A DAY. DO NOT CONSUME MORE TAHN 2000MG (2G) OF SALT/SODIUM IN A DAY. You need home oxygen when you sleep because your oxygen levels drop very low. Start using Oxygen 2 liters per minute while you are sleeping.    Diagnosis: DM (diabetes mellitus screen)  Assessment and Plan of Treatment: You have prediabetes. Your hemoglobin A1C 6.1%. Continue home Metformin.    Diagnosis: Essential hypertension  Assessment and Plan of Treatment: Dr Cali has adjusted some of your blood pressure medications. STOP taking ENALAPRIL 20mg daily. STOP taking HYDROCHLOROTHIAZIDE (HCTZ) 50mg daily. STOP taking METOPROLOL SUCCINATE 100mg daily. START taking METOPROLOL SUCCINATE 50mg twice a day.    Diagnosis: Hyperlipidemia  Assessment and Plan of Treatment: Continue taking Atorvastatin 40mg daily for cholesterol control.

## 2020-07-31 ENCOUNTER — TRANSCRIPTION ENCOUNTER (OUTPATIENT)
Age: 80
End: 2020-07-31

## 2020-07-31 VITALS — TEMPERATURE: 99 F

## 2020-07-31 LAB
ALBUMIN SERPL ELPH-MCNC: 3.8 G/DL — SIGNIFICANT CHANGE UP (ref 3.3–5)
ALP SERPL-CCNC: 101 U/L — SIGNIFICANT CHANGE UP (ref 40–120)
ALT FLD-CCNC: 25 U/L — SIGNIFICANT CHANGE UP (ref 10–45)
ANION GAP SERPL CALC-SCNC: 12 MMOL/L — SIGNIFICANT CHANGE UP (ref 5–17)
AST SERPL-CCNC: 24 U/L — SIGNIFICANT CHANGE UP (ref 10–40)
BILIRUB SERPL-MCNC: 1.4 MG/DL — HIGH (ref 0.2–1.2)
BUN SERPL-MCNC: 38 MG/DL — HIGH (ref 7–23)
CALCIUM SERPL-MCNC: 9.1 MG/DL — SIGNIFICANT CHANGE UP (ref 8.4–10.5)
CHLORIDE SERPL-SCNC: 99 MMOL/L — SIGNIFICANT CHANGE UP (ref 96–108)
CO2 SERPL-SCNC: 34 MMOL/L — HIGH (ref 22–31)
CREAT SERPL-MCNC: 1.57 MG/DL — HIGH (ref 0.5–1.3)
GLUCOSE BLDC GLUCOMTR-MCNC: 105 MG/DL — HIGH (ref 70–99)
GLUCOSE BLDC GLUCOMTR-MCNC: 107 MG/DL — HIGH (ref 70–99)
GLUCOSE SERPL-MCNC: 103 MG/DL — HIGH (ref 70–99)
HCT VFR BLD CALC: 36.1 % — LOW (ref 39–50)
HGB BLD-MCNC: 11.2 G/DL — LOW (ref 13–17)
MAGNESIUM SERPL-MCNC: 1.7 MG/DL — SIGNIFICANT CHANGE UP (ref 1.6–2.6)
MCHC RBC-ENTMCNC: 31 GM/DL — LOW (ref 32–36)
MCHC RBC-ENTMCNC: 34.6 PG — HIGH (ref 27–34)
MCV RBC AUTO: 111.4 FL — HIGH (ref 80–100)
NRBC # BLD: 0 /100 WBCS — SIGNIFICANT CHANGE UP (ref 0–0)
PLATELET # BLD AUTO: 103 K/UL — LOW (ref 150–400)
POTASSIUM SERPL-MCNC: 3.6 MMOL/L — SIGNIFICANT CHANGE UP (ref 3.5–5.3)
POTASSIUM SERPL-SCNC: 3.6 MMOL/L — SIGNIFICANT CHANGE UP (ref 3.5–5.3)
PROT SERPL-MCNC: 6.2 G/DL — SIGNIFICANT CHANGE UP (ref 6–8.3)
RBC # BLD: 3.24 M/UL — LOW (ref 4.2–5.8)
RBC # FLD: 15.4 % — HIGH (ref 10.3–14.5)
SODIUM SERPL-SCNC: 145 MMOL/L — SIGNIFICANT CHANGE UP (ref 135–145)
WBC # BLD: 5.97 K/UL — SIGNIFICANT CHANGE UP (ref 3.8–10.5)
WBC # FLD AUTO: 5.97 K/UL — SIGNIFICANT CHANGE UP (ref 3.8–10.5)

## 2020-07-31 PROCEDURE — 93306 TTE W/DOPPLER COMPLETE: CPT

## 2020-07-31 PROCEDURE — 82962 GLUCOSE BLOOD TEST: CPT

## 2020-07-31 PROCEDURE — A9567: CPT

## 2020-07-31 PROCEDURE — 94640 AIRWAY INHALATION TREATMENT: CPT

## 2020-07-31 PROCEDURE — 80053 COMPREHEN METABOLIC PANEL: CPT

## 2020-07-31 PROCEDURE — 83735 ASSAY OF MAGNESIUM: CPT

## 2020-07-31 PROCEDURE — 87635 SARS-COV-2 COVID-19 AMP PRB: CPT

## 2020-07-31 PROCEDURE — 96374 THER/PROPH/DIAG INJ IV PUSH: CPT | Mod: XU

## 2020-07-31 PROCEDURE — 78582 LUNG VENTILAT&PERFUS IMAGING: CPT

## 2020-07-31 PROCEDURE — 84480 ASSAY TRIIODOTHYRONINE (T3): CPT

## 2020-07-31 PROCEDURE — 82550 ASSAY OF CK (CPK): CPT

## 2020-07-31 PROCEDURE — 87040 BLOOD CULTURE FOR BACTERIA: CPT

## 2020-07-31 PROCEDURE — 36415 COLL VENOUS BLD VENIPUNCTURE: CPT

## 2020-07-31 PROCEDURE — 80061 LIPID PANEL: CPT

## 2020-07-31 PROCEDURE — 85379 FIBRIN DEGRADATION QUANT: CPT

## 2020-07-31 PROCEDURE — 85610 PROTHROMBIN TIME: CPT

## 2020-07-31 PROCEDURE — 71275 CT ANGIOGRAPHY CHEST: CPT

## 2020-07-31 PROCEDURE — 85027 COMPLETE CBC AUTOMATED: CPT

## 2020-07-31 PROCEDURE — 80048 BASIC METABOLIC PNL TOTAL CA: CPT

## 2020-07-31 PROCEDURE — 83880 ASSAY OF NATRIURETIC PEPTIDE: CPT

## 2020-07-31 PROCEDURE — 71045 X-RAY EXAM CHEST 1 VIEW: CPT

## 2020-07-31 PROCEDURE — 99285 EMERGENCY DEPT VISIT HI MDM: CPT | Mod: 25

## 2020-07-31 PROCEDURE — 83036 HEMOGLOBIN GLYCOSYLATED A1C: CPT

## 2020-07-31 PROCEDURE — 84484 ASSAY OF TROPONIN QUANT: CPT

## 2020-07-31 PROCEDURE — 83605 ASSAY OF LACTIC ACID: CPT

## 2020-07-31 PROCEDURE — 82553 CREATINE MB FRACTION: CPT

## 2020-07-31 PROCEDURE — 93970 EXTREMITY STUDY: CPT

## 2020-07-31 PROCEDURE — 84443 ASSAY THYROID STIM HORMONE: CPT

## 2020-07-31 PROCEDURE — 84436 ASSAY OF TOTAL THYROXINE: CPT

## 2020-07-31 PROCEDURE — 93005 ELECTROCARDIOGRAM TRACING: CPT

## 2020-07-31 PROCEDURE — 81003 URINALYSIS AUTO W/O SCOPE: CPT

## 2020-07-31 PROCEDURE — A9540: CPT

## 2020-07-31 PROCEDURE — 87086 URINE CULTURE/COLONY COUNT: CPT

## 2020-07-31 RX ORDER — MAGNESIUM SULFATE 500 MG/ML
1 VIAL (ML) INJECTION ONCE
Refills: 0 | Status: COMPLETED | OUTPATIENT
Start: 2020-07-31 | End: 2020-07-31

## 2020-07-31 RX ORDER — POTASSIUM CHLORIDE 20 MEQ
40 PACKET (EA) ORAL ONCE
Refills: 0 | Status: COMPLETED | OUTPATIENT
Start: 2020-07-31 | End: 2020-07-31

## 2020-07-31 RX ORDER — FLUTICASONE PROPIONATE AND SALMETEROL 50; 250 UG/1; UG/1
1 POWDER ORAL; RESPIRATORY (INHALATION)
Qty: 1 | Refills: 3
Start: 2020-07-31 | End: 2020-11-27

## 2020-07-31 RX ORDER — LEVALBUTEROL 1.25 MG/.5ML
3 SOLUTION, CONCENTRATE RESPIRATORY (INHALATION)
Qty: 360 | Refills: 3
Start: 2020-07-31 | End: 2020-11-27

## 2020-07-31 RX ADMIN — Medication 40 MILLIEQUIVALENT(S): at 08:48

## 2020-07-31 RX ADMIN — Medication 100 GRAM(S): at 08:49

## 2020-07-31 RX ADMIN — Medication 50 MILLIGRAM(S): at 06:44

## 2020-07-31 RX ADMIN — Medication 50 MICROGRAM(S): at 06:44

## 2020-07-31 RX ADMIN — PANTOPRAZOLE SODIUM 40 MILLIGRAM(S): 20 TABLET, DELAYED RELEASE ORAL at 06:45

## 2020-07-31 RX ADMIN — Medication 40 MILLIGRAM(S): at 06:45

## 2020-07-31 RX ADMIN — Medication 0.25 MILLIGRAM(S): at 06:44

## 2020-07-31 NOTE — PROGRESS NOTE ADULT - SUBJECTIVE AND OBJECTIVE BOX
81 y/o male hx ablation afib/aflutter hypothyroid fluid overload recurrent aflutter improved with beta blocker diuretics seen by ep refused repeat ablation clinically improved lungs good air entry heart rate well controlled bp 130/80 f/u office next week f/u dr rothman discussed with all concerned

## 2020-07-31 NOTE — DISCHARGE NOTE NURSING/CASE MANAGEMENT/SOCIAL WORK - PATIENT PORTAL LINK FT
You can access the FollowMyHealth Patient Portal offered by [facility] by registering at the following website: [email or web address]. By joining [employer]’s FollowMyHealth portal, you will also be able to view your health information using other applications (apps) compatible with our system.

## 2020-07-31 NOTE — PROGRESS NOTE ADULT - PROVIDER SPECIALTY LIST ADULT
Cardiology
Intervent Cardiology
Intervent Cardiology
Pulmonology
Electrophysiology

## 2020-08-02 LAB
CULTURE RESULTS: SIGNIFICANT CHANGE UP
CULTURE RESULTS: SIGNIFICANT CHANGE UP
SPECIMEN SOURCE: SIGNIFICANT CHANGE UP
SPECIMEN SOURCE: SIGNIFICANT CHANGE UP

## 2020-08-05 DIAGNOSIS — I48.91 UNSPECIFIED ATRIAL FIBRILLATION: ICD-10-CM

## 2020-08-05 DIAGNOSIS — Z87.891 PERSONAL HISTORY OF NICOTINE DEPENDENCE: ICD-10-CM

## 2020-08-05 DIAGNOSIS — E78.5 HYPERLIPIDEMIA, UNSPECIFIED: ICD-10-CM

## 2020-08-05 DIAGNOSIS — I48.92 UNSPECIFIED ATRIAL FLUTTER: ICD-10-CM

## 2020-08-05 DIAGNOSIS — N17.9 ACUTE KIDNEY FAILURE, UNSPECIFIED: ICD-10-CM

## 2020-08-05 DIAGNOSIS — I45.10 UNSPECIFIED RIGHT BUNDLE-BRANCH BLOCK: ICD-10-CM

## 2020-08-05 DIAGNOSIS — E11.9 TYPE 2 DIABETES MELLITUS WITHOUT COMPLICATIONS: ICD-10-CM

## 2020-08-05 DIAGNOSIS — J98.11 ATELECTASIS: ICD-10-CM

## 2020-08-05 DIAGNOSIS — E03.9 HYPOTHYROIDISM, UNSPECIFIED: ICD-10-CM

## 2020-08-05 DIAGNOSIS — I11.0 HYPERTENSIVE HEART DISEASE WITH HEART FAILURE: ICD-10-CM

## 2020-08-05 DIAGNOSIS — Z79.01 LONG TERM (CURRENT) USE OF ANTICOAGULANTS: ICD-10-CM

## 2020-08-05 DIAGNOSIS — J45.909 UNSPECIFIED ASTHMA, UNCOMPLICATED: ICD-10-CM

## 2020-08-05 DIAGNOSIS — I42.9 CARDIOMYOPATHY, UNSPECIFIED: ICD-10-CM

## 2020-08-05 DIAGNOSIS — I27.20 PULMONARY HYPERTENSION, UNSPECIFIED: ICD-10-CM

## 2020-08-05 DIAGNOSIS — I50.31 ACUTE DIASTOLIC (CONGESTIVE) HEART FAILURE: ICD-10-CM

## 2020-08-25 ENCOUNTER — EMERGENCY (EMERGENCY)
Facility: HOSPITAL | Age: 80
LOS: 1 days | Discharge: HOME | End: 2020-08-25
Attending: EMERGENCY MEDICINE | Admitting: EMERGENCY MEDICINE
Payer: MEDICARE

## 2020-08-25 VITALS
WEIGHT: 259.93 LBS | HEART RATE: 110 BPM | DIASTOLIC BLOOD PRESSURE: 110 MMHG | OXYGEN SATURATION: 95 % | HEIGHT: 71 IN | TEMPERATURE: 97 F | SYSTOLIC BLOOD PRESSURE: 159 MMHG | RESPIRATION RATE: 22 BRPM

## 2020-08-25 VITALS
DIASTOLIC BLOOD PRESSURE: 99 MMHG | OXYGEN SATURATION: 96 % | RESPIRATION RATE: 20 BRPM | SYSTOLIC BLOOD PRESSURE: 154 MMHG | HEART RATE: 83 BPM | TEMPERATURE: 98 F

## 2020-08-25 DIAGNOSIS — Z88.0 ALLERGY STATUS TO PENICILLIN: ICD-10-CM

## 2020-08-25 DIAGNOSIS — Z20.828 CONTACT WITH AND (SUSPECTED) EXPOSURE TO OTHER VIRAL COMMUNICABLE DISEASES: ICD-10-CM

## 2020-08-25 DIAGNOSIS — Z79.84 LONG TERM (CURRENT) USE OF ORAL HYPOGLYCEMIC DRUGS: ICD-10-CM

## 2020-08-25 DIAGNOSIS — I10 ESSENTIAL (PRIMARY) HYPERTENSION: ICD-10-CM

## 2020-08-25 DIAGNOSIS — Z98.890 OTHER SPECIFIED POSTPROCEDURAL STATES: Chronic | ICD-10-CM

## 2020-08-25 DIAGNOSIS — Z79.899 OTHER LONG TERM (CURRENT) DRUG THERAPY: ICD-10-CM

## 2020-08-25 DIAGNOSIS — E11.9 TYPE 2 DIABETES MELLITUS WITHOUT COMPLICATIONS: ICD-10-CM

## 2020-08-25 DIAGNOSIS — Z88.8 ALLERGY STATUS TO OTHER DRUGS, MEDICAMENTS AND BIOLOGICAL SUBSTANCES: ICD-10-CM

## 2020-08-25 LAB — SARS-COV-2 RNA SPEC QL NAA+PROBE: SIGNIFICANT CHANGE UP

## 2020-08-25 PROCEDURE — 99283 EMERGENCY DEPT VISIT LOW MDM: CPT | Mod: CS

## 2020-08-25 PROCEDURE — 87635 SARS-COV-2 COVID-19 AMP PRB: CPT

## 2020-08-25 PROCEDURE — 99283 EMERGENCY DEPT VISIT LOW MDM: CPT

## 2020-08-25 NOTE — ED PROVIDER NOTE - OBJECTIVE STATEMENT
79 y/o M with PMHx of HTN, DM, afib on xarelto presents to the ED requesting for COVID PCR swab for a cardioversion procedure on Thursday. No other complaints at this time. No covid sxs or cardiac ss.

## 2020-08-25 NOTE — ED ADULT NURSE NOTE - OBJECTIVE STATEMENT
pt received into spot D A&Ox3 ambulatory appears comfortable arrives via walk in triage for covid test r/t preop cardioversion r/t afib in few days denies fever chills cp sob palpitations

## 2020-08-25 NOTE — ED ADULT NURSE NOTE - NSIMPLEMENTINTERV_GEN_ALL_ED
Implemented All Fall with Harm Risk Interventions:  [place] to call system. Call bell, personal items and telephone within reach. Instruct patient to call for assistance. Room bathroom lighting operational. Non-slip footwear when patient is off stretcher. Physically safe environment: no spills, clutter or unnecessary equipment. Stretcher in lowest position, wheels locked, appropriate side rails in place. Provide visual cue, wrist band, yellow gown, etc. Monitor gait and stability. Monitor for mental status changes and reorient to person, place, and time. Review medications for side effects contributing to fall risk. Reinforce activity limits and safety measures with patient and family. Provide visual clues: red socks.

## 2020-08-25 NOTE — ED PROVIDER NOTE - PMH
Atrial fibrillation and flutter    DM (diabetes mellitus screen)    Essential hypertension    History of hypothyroidism    Uncomplicated asthma, unspecified asthma severity

## 2020-08-25 NOTE — ED PROVIDER NOTE - PATIENT PORTAL LINK FT
You can access the FollowMyHealth Patient Portal offered by [facility] by registering at the following website: [email or web address]. By joining [employer]’s FollowMyHealth portal, you will also be able to view your health information using other applications (apps) compatible with our system.

## 2020-08-26 ENCOUNTER — TRANSCRIPTION ENCOUNTER (OUTPATIENT)
Age: 80
End: 2020-08-26

## 2020-08-28 ENCOUNTER — TRANSCRIPTION ENCOUNTER (OUTPATIENT)
Age: 80
End: 2020-08-28

## 2020-08-28 ENCOUNTER — INPATIENT (INPATIENT)
Facility: HOSPITAL | Age: 80
LOS: 3 days | Discharge: HOME | DRG: 292 | End: 2020-09-01
Attending: INTERNAL MEDICINE | Admitting: INTERNAL MEDICINE
Payer: MEDICARE

## 2020-08-28 VITALS
TEMPERATURE: 98 F | DIASTOLIC BLOOD PRESSURE: 64 MMHG | OXYGEN SATURATION: 99 % | HEART RATE: 65 BPM | RESPIRATION RATE: 20 BRPM | SYSTOLIC BLOOD PRESSURE: 121 MMHG

## 2020-08-28 DIAGNOSIS — Z86.39 PERSONAL HISTORY OF OTHER ENDOCRINE, NUTRITIONAL AND METABOLIC DISEASE: ICD-10-CM

## 2020-08-28 DIAGNOSIS — I50.21 ACUTE SYSTOLIC (CONGESTIVE) HEART FAILURE: ICD-10-CM

## 2020-08-28 DIAGNOSIS — I48.4 ATYPICAL ATRIAL FLUTTER: ICD-10-CM

## 2020-08-28 DIAGNOSIS — Z88.1 ALLERGY STATUS TO OTHER ANTIBIOTIC AGENTS STATUS: ICD-10-CM

## 2020-08-28 DIAGNOSIS — Z98.890 OTHER SPECIFIED POSTPROCEDURAL STATES: Chronic | ICD-10-CM

## 2020-08-28 DIAGNOSIS — Z13.1 ENCOUNTER FOR SCREENING FOR DIABETES MELLITUS: ICD-10-CM

## 2020-08-28 LAB
ANION GAP SERPL CALC-SCNC: 15 MMOL/L — SIGNIFICANT CHANGE UP (ref 5–17)
BUN SERPL-MCNC: 27 MG/DL — HIGH (ref 7–23)
CALCIUM SERPL-MCNC: 9.3 MG/DL — SIGNIFICANT CHANGE UP (ref 8.4–10.5)
CHLORIDE SERPL-SCNC: 97 MMOL/L — SIGNIFICANT CHANGE UP (ref 96–108)
CO2 SERPL-SCNC: 31 MMOL/L — SIGNIFICANT CHANGE UP (ref 22–31)
CREAT SERPL-MCNC: 1.43 MG/DL — HIGH (ref 0.5–1.3)
GLUCOSE BLDC GLUCOMTR-MCNC: 103 MG/DL — HIGH (ref 70–99)
GLUCOSE BLDC GLUCOMTR-MCNC: 108 MG/DL — HIGH (ref 70–99)
GLUCOSE SERPL-MCNC: 130 MG/DL — HIGH (ref 70–99)
NT-PROBNP SERPL-SCNC: 1826 PG/ML — HIGH (ref 0–300)
POTASSIUM SERPL-MCNC: 3.4 MMOL/L — LOW (ref 3.5–5.3)
POTASSIUM SERPL-SCNC: 3.4 MMOL/L — LOW (ref 3.5–5.3)
SODIUM SERPL-SCNC: 143 MMOL/L — SIGNIFICANT CHANGE UP (ref 135–145)

## 2020-08-28 PROCEDURE — 92960 CARDIOVERSION ELECTRIC EXT: CPT

## 2020-08-28 PROCEDURE — 71045 X-RAY EXAM CHEST 1 VIEW: CPT | Mod: 26

## 2020-08-28 RX ORDER — INSULIN LISPRO 100/ML
VIAL (ML) SUBCUTANEOUS
Refills: 0 | Status: DISCONTINUED | OUTPATIENT
Start: 2020-08-28 | End: 2020-09-01

## 2020-08-28 RX ORDER — LEVOTHYROXINE SODIUM 125 MCG
50 TABLET ORAL DAILY
Refills: 0 | Status: DISCONTINUED | OUTPATIENT
Start: 2020-08-28 | End: 2020-09-01

## 2020-08-28 RX ORDER — RIVAROXABAN 15 MG-20MG
20 KIT ORAL
Refills: 0 | Status: DISCONTINUED | OUTPATIENT
Start: 2020-08-28 | End: 2020-09-01

## 2020-08-28 RX ORDER — POTASSIUM CHLORIDE 20 MEQ
40 PACKET (EA) ORAL ONCE
Refills: 0 | Status: COMPLETED | OUTPATIENT
Start: 2020-08-28 | End: 2020-08-28

## 2020-08-28 RX ORDER — SODIUM CHLORIDE 9 MG/ML
1000 INJECTION, SOLUTION INTRAVENOUS
Refills: 0 | Status: DISCONTINUED | OUTPATIENT
Start: 2020-08-28 | End: 2020-09-01

## 2020-08-28 RX ORDER — FUROSEMIDE 40 MG
40 TABLET ORAL ONCE
Refills: 0 | Status: COMPLETED | OUTPATIENT
Start: 2020-08-28 | End: 2020-08-28

## 2020-08-28 RX ORDER — METOPROLOL TARTRATE 50 MG
50 TABLET ORAL
Refills: 0 | Status: DISCONTINUED | OUTPATIENT
Start: 2020-08-29 | End: 2020-08-31

## 2020-08-28 RX ORDER — GLUCAGON INJECTION, SOLUTION 0.5 MG/.1ML
1 INJECTION, SOLUTION SUBCUTANEOUS ONCE
Refills: 0 | Status: DISCONTINUED | OUTPATIENT
Start: 2020-08-28 | End: 2020-09-01

## 2020-08-28 RX ORDER — BUDESONIDE AND FORMOTEROL FUMARATE DIHYDRATE 160; 4.5 UG/1; UG/1
2 AEROSOL RESPIRATORY (INHALATION)
Refills: 0 | Status: DISCONTINUED | OUTPATIENT
Start: 2020-08-28 | End: 2020-09-01

## 2020-08-28 RX ORDER — FUROSEMIDE 40 MG
40 TABLET ORAL
Refills: 0 | Status: DISCONTINUED | OUTPATIENT
Start: 2020-08-29 | End: 2020-08-29

## 2020-08-28 RX ORDER — DEXTROSE 50 % IN WATER 50 %
15 SYRINGE (ML) INTRAVENOUS ONCE
Refills: 0 | Status: DISCONTINUED | OUTPATIENT
Start: 2020-08-28 | End: 2020-09-01

## 2020-08-28 RX ORDER — DEXTROSE 50 % IN WATER 50 %
12.5 SYRINGE (ML) INTRAVENOUS ONCE
Refills: 0 | Status: DISCONTINUED | OUTPATIENT
Start: 2020-08-28 | End: 2020-09-01

## 2020-08-28 RX ORDER — DEXTROSE 50 % IN WATER 50 %
25 SYRINGE (ML) INTRAVENOUS ONCE
Refills: 0 | Status: DISCONTINUED | OUTPATIENT
Start: 2020-08-28 | End: 2020-09-01

## 2020-08-28 RX ORDER — ATORVASTATIN CALCIUM 80 MG/1
40 TABLET, FILM COATED ORAL AT BEDTIME
Refills: 0 | Status: DISCONTINUED | OUTPATIENT
Start: 2020-08-28 | End: 2020-09-01

## 2020-08-28 RX ORDER — RIVAROXABAN 15 MG-20MG
20 KIT ORAL
Refills: 0 | Status: DISCONTINUED | OUTPATIENT
Start: 2020-08-28 | End: 2020-08-28

## 2020-08-28 RX ADMIN — Medication 40 MILLIEQUIVALENT(S): at 22:08

## 2020-08-28 RX ADMIN — Medication 40 MILLIEQUIVALENT(S): at 14:49

## 2020-08-28 RX ADMIN — ATORVASTATIN CALCIUM 40 MILLIGRAM(S): 80 TABLET, FILM COATED ORAL at 22:08

## 2020-08-28 RX ADMIN — Medication 40 MILLIGRAM(S): at 16:22

## 2020-08-28 RX ADMIN — RIVAROXABAN 20 MILLIGRAM(S): KIT at 22:07

## 2020-08-28 NOTE — H&P ADULT - PROBLEM SELECTOR PLAN 1
Pt with clinical HF post cardioversion. This is a clinic diagnosis since his recent Echo in July showed normal LVEF.  Will order for repeat Echo tomorrow.   Admit for IV lasix.   Will reduced Metoprolol dose. Hold tonight dose.   Strict In/out.

## 2020-08-28 NOTE — H&P ADULT - PROBLEM SELECTOR PLAN 3
Pt with body habitus suggestive of sleep apnea.  He is going to undergo sleep apnea study outpatient.   Ordered BIPAP for both CHF and SHARONA to be use nocturnally.

## 2020-08-28 NOTE — H&P ADULT - HISTORY OF PRESENT ILLNESS
81 yo male with history  of HTN, HLD, DM, afib/flutter (hx of ablation/DCCV, w/recurrent afib/flutter refusing subsequent ablations at this time, on Xarelto), asthma (no intubations), hypothyroid who presents today for scheduled DCCV and was found to be in heart failure with SOB, PINEDO, 3 + pitting LE edema. Patient was cardioverted.     to [facility] ED 07/28/2020 c/o LE edema and palpitations and found to be in new-onset acute CHF exacerbation in setting of Aflutter with RVR, TTE pending, currently receiving IV diuresis and diuresing well, saturating well on 3L NC, d-dimer 774, CT PE could not r/o lobar or subsegmental PE, plan for LE venous duplex/VQ scan, EP consulted for AF with RVR (currently rate controlled 80s since Toprol reinitiated), continued on Xarelto for AC for now, Echo showed normal LV size and function EF 55% but mildly dilated RV. Severely dilated RA. PASP 66mmHg. VQ scan performed on 07/30. Pt wants to defer decision for possible ablation until he sees Dr Sprague in the office next week. 79 y/o overweight male with history  of HTN, HLD, DM, afib/flutter (hx of ablation in 2016 -- RFA and CTI), DCCV, asthma (no intubations), hypothyroid who presents today for scheduled DCCV and was found to be in heart failure with SOB, PINEDO, 3 + pitting LE edema. Patient was cardioverted today and post procedure developed acute and chronic CHF.  He was given Lasix 40 mg IV x 1 in the EP holding area. CXR showing pulm vascular congestion. BNP 1800. O2 Sat 85% on RA.  He is currently better. O2 sat 99% on 3 L NC and has voided 150cc just now.   Of note, he was recently admitted to [facility] on 7/28/20 with CHF exacerbation in setting of aflutter RVR. He was diuresed and evaluated by EP team. He kindly declined any intervention at that time. Post discharge from the hospital, he followed up with Dr. Cali. Pt states that his Metoprolol ER was increased from 50 mg BID (from upon discharge) to 100 mg BID.  He still takes Lasix 40 mg BID.

## 2020-08-28 NOTE — H&P ADULT - ASSESSMENT
79 y/o overweight male with history of HTN, HLD, DM, afib/flutter (hx of ablation in 2016 -- RFA and CTI), DCCV, asthma (no intubations), hypothyroid, who was sent for elective DCCV by his cardiologist today. He underwent DCCV but pt developed acute and chronic CHF.  EF from recent Echo showed normal LVEF.

## 2020-08-28 NOTE — H&P ADULT - PROBLEM SELECTOR PLAN 2
s/p cardioversion today  continue Xarelto tonight   Reduced Metoprolol Er from 100 mg bid to 50 mg bid given clinical HF.

## 2020-08-29 DIAGNOSIS — I50.33 ACUTE ON CHRONIC DIASTOLIC (CONGESTIVE) HEART FAILURE: ICD-10-CM

## 2020-08-29 LAB
ANION GAP SERPL CALC-SCNC: 13 MMOL/L — SIGNIFICANT CHANGE UP (ref 5–17)
BUN SERPL-MCNC: 33 MG/DL — HIGH (ref 7–23)
CALCIUM SERPL-MCNC: 8.9 MG/DL — SIGNIFICANT CHANGE UP (ref 8.4–10.5)
CHLORIDE SERPL-SCNC: 100 MMOL/L — SIGNIFICANT CHANGE UP (ref 96–108)
CO2 SERPL-SCNC: 31 MMOL/L — SIGNIFICANT CHANGE UP (ref 22–31)
CREAT SERPL-MCNC: 1.47 MG/DL — HIGH (ref 0.5–1.3)
GLUCOSE BLDC GLUCOMTR-MCNC: 101 MG/DL — HIGH (ref 70–99)
GLUCOSE BLDC GLUCOMTR-MCNC: 111 MG/DL — HIGH (ref 70–99)
GLUCOSE BLDC GLUCOMTR-MCNC: 197 MG/DL — HIGH (ref 70–99)
GLUCOSE BLDC GLUCOMTR-MCNC: 93 MG/DL — SIGNIFICANT CHANGE UP (ref 70–99)
GLUCOSE SERPL-MCNC: 96 MG/DL — SIGNIFICANT CHANGE UP (ref 70–99)
HCT VFR BLD CALC: 35.4 % — LOW (ref 39–50)
HGB BLD-MCNC: 10.9 G/DL — LOW (ref 13–17)
MAGNESIUM SERPL-MCNC: 1.8 MG/DL — SIGNIFICANT CHANGE UP (ref 1.6–2.6)
MCHC RBC-ENTMCNC: 30.8 GM/DL — LOW (ref 32–36)
MCHC RBC-ENTMCNC: 33.7 PG — SIGNIFICANT CHANGE UP (ref 27–34)
MCV RBC AUTO: 109.6 FL — HIGH (ref 80–100)
NRBC # BLD: 0 /100 WBCS — SIGNIFICANT CHANGE UP (ref 0–0)
PLATELET # BLD AUTO: 144 K/UL — LOW (ref 150–400)
POTASSIUM SERPL-MCNC: 3.5 MMOL/L — SIGNIFICANT CHANGE UP (ref 3.5–5.3)
POTASSIUM SERPL-SCNC: 3.5 MMOL/L — SIGNIFICANT CHANGE UP (ref 3.5–5.3)
RBC # BLD: 3.23 M/UL — LOW (ref 4.2–5.8)
RBC # FLD: 14.6 % — HIGH (ref 10.3–14.5)
SARS-COV-2 IGG SERPL QL IA: NEGATIVE — SIGNIFICANT CHANGE UP
SARS-COV-2 IGM SERPL IA-ACNC: 0.27 INDEX — SIGNIFICANT CHANGE UP
SODIUM SERPL-SCNC: 144 MMOL/L — SIGNIFICANT CHANGE UP (ref 135–145)
WBC # BLD: 4.7 K/UL — SIGNIFICANT CHANGE UP (ref 3.8–10.5)
WBC # FLD AUTO: 4.7 K/UL — SIGNIFICANT CHANGE UP (ref 3.8–10.5)

## 2020-08-29 PROCEDURE — 99233 SBSQ HOSP IP/OBS HIGH 50: CPT

## 2020-08-29 RX ORDER — FUROSEMIDE 40 MG
40 TABLET ORAL ONCE
Refills: 0 | Status: COMPLETED | OUTPATIENT
Start: 2020-08-29 | End: 2020-08-29

## 2020-08-29 RX ORDER — FUROSEMIDE 40 MG
80 TABLET ORAL
Refills: 0 | Status: DISCONTINUED | OUTPATIENT
Start: 2020-08-29 | End: 2020-08-30

## 2020-08-29 RX ORDER — SENNA PLUS 8.6 MG/1
2 TABLET ORAL ONCE
Refills: 0 | Status: COMPLETED | OUTPATIENT
Start: 2020-08-29 | End: 2020-08-29

## 2020-08-29 RX ORDER — ACETAMINOPHEN 500 MG
650 TABLET ORAL ONCE
Refills: 0 | Status: COMPLETED | OUTPATIENT
Start: 2020-08-29 | End: 2020-08-29

## 2020-08-29 RX ADMIN — RIVAROXABAN 20 MILLIGRAM(S): KIT at 18:17

## 2020-08-29 RX ADMIN — Medication 40 MILLIGRAM(S): at 06:20

## 2020-08-29 RX ADMIN — Medication 650 MILLIGRAM(S): at 03:05

## 2020-08-29 RX ADMIN — Medication 80 MILLIGRAM(S): at 19:14

## 2020-08-29 RX ADMIN — BUDESONIDE AND FORMOTEROL FUMARATE DIHYDRATE 2 PUFF(S): 160; 4.5 AEROSOL RESPIRATORY (INHALATION) at 06:21

## 2020-08-29 RX ADMIN — Medication 1: at 11:54

## 2020-08-29 RX ADMIN — Medication 50 MILLIGRAM(S): at 06:20

## 2020-08-29 RX ADMIN — ATORVASTATIN CALCIUM 40 MILLIGRAM(S): 80 TABLET, FILM COATED ORAL at 21:16

## 2020-08-29 RX ADMIN — Medication 50 MILLIGRAM(S): at 18:17

## 2020-08-29 RX ADMIN — SENNA PLUS 2 TABLET(S): 8.6 TABLET ORAL at 08:05

## 2020-08-29 RX ADMIN — Medication 50 MICROGRAM(S): at 06:20

## 2020-08-29 RX ADMIN — Medication 40 MILLIGRAM(S): at 14:50

## 2020-08-29 RX ADMIN — Medication 650 MILLIGRAM(S): at 04:18

## 2020-08-29 RX ADMIN — BUDESONIDE AND FORMOTEROL FUMARATE DIHYDRATE 2 PUFF(S): 160; 4.5 AEROSOL RESPIRATORY (INHALATION) at 18:17

## 2020-08-29 NOTE — PROGRESS NOTE ADULT - ASSESSMENT
80 year old male with past medical history of hypertension, hyperlipidemia, atrial fibrillation (ablation in 2016), hypothyroidism, HFpEF who is admitted to hospital for acute on chronic heart failure exacerbation. Patient presented for scheduled Cardioversion 8/28/2020. Post cardioversion he was noticed to be hypoxic and in decompensated acute on chronic diastolic heart failure. Chest xray showed pulmonary congestion with elevated BNP of 1800. Pt started on Lasix 40mg IV BID which was increased to Lasix 80mg IV BID on 8/29 as pt's urine output not at adequate state. Pt refused BIPAP overnight from 8/28-8/29 and will attempt to use the CPAP setting overnight from 8/29-8/30.

## 2020-08-29 NOTE — PROGRESS NOTE ADULT - PROBLEM SELECTOR PLAN 1
HFpEF, NYHA Class IV, ACC AHA STAGE C  - Continue diuresis with goal net negative about 1.5 to 2 L per 24 hour. Overnight he is still +ve 1400 ml.  - Recommend increasing furosemide to 80 mg iv bid.  - keep K > 4 and Mag > 2. Replete electrolytes as needed  - continue metoprolol succinate 50 mg po bid.

## 2020-08-29 NOTE — PROGRESS NOTE ADULT - SUBJECTIVE AND OBJECTIVE BOX
EPS Progress Note    S:   Patient seen this morning. Has dyspnea while talking.  No acute events overnight. Net +Ve 1400 ml since admission but states he is urinating more this morning.    T(C): 36.6 (08-29-20 @ 10:25), Max: 37 (08-29-20 @ 05:01)  HR: 65 (08-29-20 @ 09:45) (62 - 72)  BP: 167/72 (08-29-20 @ 09:45) (110/56 - 167/72)  RR: 19 (08-29-20 @ 09:45) (16 - 20)  SpO2: 96% (08-29-20 @ 09:45) (94% - 99%)       Telemetry:   Sinus rhythm 60-70          General:  No acute distress      Chest:  bibasilar crackles, Cardiac:  Regular rate and rhythm.  No murmur, rubs, or gallops heard.  Abdomen:  Soft without rebound or guarding.  Bowel sounds are present in all 4 quadrants.  No hepatosplenomegaly  Extremities:  2+bilateral lower extremity pitting edema.  No cyanosis or clubbing       MEDICATIONS  (STANDING):  atorvastatin 40 milliGRAM(s) Oral at bedtime  budesonide  80 MICROgram(s)/formoterol 4.5 MICROgram(s) Inhaler 2 Puff(s) Inhalation two times a day  dextrose 5%. 1000 milliLiter(s) (50 mL/Hr) IV Continuous <Continuous>  dextrose 50% Injectable 12.5 Gram(s) IV Push once  dextrose 50% Injectable 25 Gram(s) IV Push once  dextrose 50% Injectable 25 Gram(s) IV Push once  furosemide   Injectable 40 milliGRAM(s) IV Push two times a day  insulin lispro (HumaLOG) corrective regimen sliding scale   SubCutaneous Before meals and at bedtime  levothyroxine 50 MICROGram(s) Oral daily  metoprolol succinate ER 50 milliGRAM(s) Oral two times a day  rivaroxaban 20 milliGRAM(s) Oral with dinner    MEDICATIONS  (PRN):  dextrose 40% Gel 15 Gram(s) Oral once PRN Blood Glucose LESS THAN 70 milliGRAM(s)/deciliter  glucagon  Injectable 1 milliGRAM(s) IntraMuscular once PRN Glucose LESS THAN 70 milligrams/deciliter                                 LABS:                        10.9   4.70  )-----------( 144      ( 29 Aug 2020 05:53 )             35.4     08-29    144  |  100  |  33<H>  ----------------------------<  96  3.5   |  31  |  1.47<H>    Ca    8.9      29 Aug 2020 05:53  Mg     1.8     08-29          Assessment/Plan:    80 year old male with past medical history of hypertension, hyperlipidemia, atrial fibrillation (ablation in 2016), hypothyroidism, HFpEF who is admitted to hospital for acute on chronic heart failure exacerbation.  Patient presented for scheduled Cardioversion yesterday. Post cardioversion he was noticed to be hypoxic and in decompensated heart failure. Chest xray showed pulmonary congestion with elevated BNP of 1800.    # Acute on chronic Congestive heart failure  HFpEF, NYHA Class IV, ACC AHA STAGE C  - Continue diuresis with goal net negative about 1.5 to 2 L per 24 hour.  - keep K > 4 and Mag > 2.  -       # Atrial fibrillation  s/p DCCV on 8/28  - currently maintaining sinus rhythm.  - continue metoprolol succinate 50 mg po bid  - continue Xarelto 20 mg daily. EPS Progress Note    S:   Patient seen this morning. Has dyspnea while talking.  No acute events overnight. Net +Ve 1400 ml since admission but states he is urinating more this morning.    T(C): 36.6 (08-29-20 @ 10:25), Max: 37 (08-29-20 @ 05:01)  HR: 65 (08-29-20 @ 09:45) (62 - 72)  BP: 167/72 (08-29-20 @ 09:45) (110/56 - 167/72)  RR: 19 (08-29-20 @ 09:45) (16 - 20)  SpO2: 96% (08-29-20 @ 09:45) (94% - 99%)       Telemetry:   Sinus rhythm 60-70          General:  No acute distress      Chest:  bibasilar crackles, Cardiac:  Regular rate and rhythm.  No murmur, rubs, or gallops heard.  Abdomen:  Soft without rebound or guarding.  Bowel sounds are present in all 4 quadrants.  No hepatosplenomegaly  Extremities:  2+bilateral lower extremity pitting edema.  No cyanosis or clubbing       MEDICATIONS  (STANDING):  atorvastatin 40 milliGRAM(s) Oral at bedtime  budesonide  80 MICROgram(s)/formoterol 4.5 MICROgram(s) Inhaler 2 Puff(s) Inhalation two times a day  dextrose 5%. 1000 milliLiter(s) (50 mL/Hr) IV Continuous <Continuous>  dextrose 50% Injectable 12.5 Gram(s) IV Push once  dextrose 50% Injectable 25 Gram(s) IV Push once  dextrose 50% Injectable 25 Gram(s) IV Push once  furosemide   Injectable 40 milliGRAM(s) IV Push two times a day  insulin lispro (HumaLOG) corrective regimen sliding scale   SubCutaneous Before meals and at bedtime  levothyroxine 50 MICROGram(s) Oral daily  metoprolol succinate ER 50 milliGRAM(s) Oral two times a day  rivaroxaban 20 milliGRAM(s) Oral with dinner    MEDICATIONS  (PRN):  dextrose 40% Gel 15 Gram(s) Oral once PRN Blood Glucose LESS THAN 70 milliGRAM(s)/deciliter  glucagon  Injectable 1 milliGRAM(s) IntraMuscular once PRN Glucose LESS THAN 70 milligrams/deciliter                                 LABS:                        10.9   4.70  )-----------( 144      ( 29 Aug 2020 05:53 )             35.4     08-29    144  |  100  |  33<H>  ----------------------------<  96  3.5   |  31  |  1.47<H>    Ca    8.9      29 Aug 2020 05:53  Mg     1.8     08-29          Assessment/Plan:    80 year old male with past medical history of hypertension, hyperlipidemia, atrial fibrillation (ablation in 2016), hypothyroidism, HFpEF who is admitted to hospital for acute on chronic heart failure exacerbation.  Patient presented for scheduled Cardioversion yesterday. Post cardioversion he was noticed to be hypoxic and in decompensated heart failure. Chest xray showed pulmonary congestion with elevated BNP of 1800.    # Acute on chronic Congestive heart failure  HFpEF, NYHA Class IV, ACC AHA STAGE C  - Continue diuresis with goal net negative about 1.5 to 2 L per 24 hour. Overnight he is still +ve 1400 ml.  - Recommend increasing furosemide to 80 mg iv bid.  - keep K > 4 and Mag > 2. Replete electrolytes as needed  - continue metoprolol succinate 50 mg po bid.        # Atrial fibrillation  s/p DCCV on 8/28  - currently maintaining sinus rhythm.  - continue metoprolol succinate 50 mg po bid  - continue Xarelto 20 mg daily.

## 2020-08-29 NOTE — PROGRESS NOTE ADULT - SUBJECTIVE AND OBJECTIVE BOX
Interventional Cardiology PA Adult Progress Note    Subjective Assessment:  	  MEDICATIONS:  furosemide   Injectable 80 milliGRAM(s) IV Push two times a day  metoprolol succinate ER 50 milliGRAM(s) Oral two times a day      budesonide  80 MICROgram(s)/formoterol 4.5 MICROgram(s) Inhaler 2 Puff(s) Inhalation two times a day        atorvastatin 40 milliGRAM(s) Oral at bedtime  dextrose 40% Gel 15 Gram(s) Oral once PRN  dextrose 50% Injectable 12.5 Gram(s) IV Push once  dextrose 50% Injectable 25 Gram(s) IV Push once  dextrose 50% Injectable 25 Gram(s) IV Push once  glucagon  Injectable 1 milliGRAM(s) IntraMuscular once PRN  insulin lispro (HumaLOG) corrective regimen sliding scale   SubCutaneous Before meals and at bedtime  levothyroxine 50 MICROGram(s) Oral daily    dextrose 5%. 1000 milliLiter(s) IV Continuous <Continuous>  rivaroxaban 20 milliGRAM(s) Oral with dinner      	    [PHYSICAL EXAM:  TELEMETRY:  T(C): 36.7 (08-29-20 @ 14:23), Max: 37 (08-29-20 @ 05:01)  HR: 66 (08-29-20 @ 12:35) (62 - 72)  BP: 143/67 (08-29-20 @ 12:35) (110/56 - 167/72)  RR: 19 (08-29-20 @ 12:35) (16 - 72)  SpO2: 95% (08-29-20 @ 10:30) (94% - 99%)  Wt(kg): --  I&O's Summary    28 Aug 2020 07:01  -  29 Aug 2020 07:00  --------------------------------------------------------  IN: 2100 mL / OUT: 700 mL / NET: 1400 mL    29 Aug 2020 07:01  -  29 Aug 2020 16:04  --------------------------------------------------------  IN: 480 mL / OUT: 320 mL / NET: 160 mL      Height (cm): 180.3 (08-28 @ 17:57)  Weight (kg): 122 (08-28 @ 17:57)  BMI (kg/m2): 37.5 (08-28 @ 17:57)  BSA (m2): 2.39 (08-28 @ 17:57)  Lemon:  Central/PICC/Mid Line:                                         Appearance: Normal	  HEENT:   Normal oral mucosa, PERRL, EOMI	  Neck: Supple  Cardiovascular: Normal S1 S2, No JVD, No murmurs,   Respiratory: decreased breath sounds b/l bases  Gastrointestinal:  Soft, Non-tender, + BS	  Skin: No rashes, No ecchymoses, No cyanosis  Extremities: Normal range of motion, +3 pitting edema b/l lower extremities  Vascular: Peripheral pulses palpable 2+ bilaterally  Neurologic: Non-focal  Psychiatry: A & O x 3, Mood & affect appropriate    LABS:                      10.9   4.70  )-----------( 144      ( 29 Aug 2020 05:53 )             35.4     08-29    144  |  100  |  33<H>  ----------------------------<  96  3.5   |  31  |  1.47<H>    Ca    8.9      29 Aug 2020 05:53  Mg     1.8     08-29      proBNP: Serum Pro-Brain Natriuretic Peptide: 1826 pg/mL (08-28 @ 16:28)      ASSESSMENT/PLAN: Interventional Cardiology PA Adult Progress Note    CC:   Subjective Assessment:        ROS otherwise negative except as stated in HPI and subjective  MEDICATIONS:  furosemide   Injectable 80 milliGRAM(s) IV Push two times a day  metoprolol succinate ER 50 milliGRAM(s) Oral two times a day  budesonide  80 MICROgram(s)/formoterol 4.5 MICROgram(s) Inhaler 2 Puff(s) Inhalation two times a day  atorvastatin 40 milliGRAM(s) Oral at bedtime  dextrose 40% Gel 15 Gram(s) Oral once PRN  dextrose 50% Injectable 12.5 Gram(s) IV Push once  dextrose 50% Injectable 25 Gram(s) IV Push once  dextrose 50% Injectable 25 Gram(s) IV Push once  glucagon  Injectable 1 milliGRAM(s) IntraMuscular once PRN  insulin lispro (HumaLOG) corrective regimen sliding scale   SubCutaneous Before meals and at bedtime  levothyroxine 50 MICROGram(s) Oral daily  dextrose 5%. 1000 milliLiter(s) IV Continuous <Continuous>  rivaroxaban 20 milliGRAM(s) Oral with dinner    PHYSICAL EXAM:  TELEMETRY:  T(C): 36.7 (08-29-20 @ 14:23), Max: 37 (08-29-20 @ 05:01)  HR: 66 (08-29-20 @ 12:35) (62 - 72)  BP: 143/67 (08-29-20 @ 12:35) (110/56 - 167/72)  RR: 19 (08-29-20 @ 12:35) (16 - 72)  SpO2: 95% (08-29-20 @ 10:30) (94% - 99%)  Wt(kg): --  I&O's Summary    28 Aug 2020 07:01  -  29 Aug 2020 07:00  --------------------------------------------------------  IN: 2100 mL / OUT: 700 mL / NET: 1400 mL    29 Aug 2020 07:01  -  29 Aug 2020 16:04  --------------------------------------------------------  IN: 480 mL / OUT: 320 mL / NET: 160 mL      Height (cm): 180.3 (08-28 @ 17:57)  Weight (kg): 122 (08-28 @ 17:57)  BMI (kg/m2): 37.5 (08-28 @ 17:57)  BSA (m2): 2.39 (08-28 @ 17:57)  Lemon:  Central/PICC/Mid Line:                                         Appearance: Normal	  HEENT:   Normal oral mucosa, PERRL, EOMI	  Neck: Supple  Cardiovascular: Normal S1 S2, No JVD, No murmurs,   Respiratory: decreased breath sounds b/l bases  Gastrointestinal:  Soft, Non-tender, + BS	  Skin: No rashes, No ecchymoses, No cyanosis  Extremities: Normal range of motion, +3 pitting edema b/l lower extremities  Vascular: Peripheral pulses palpable 2+ bilaterally  Neurologic: Non-focal  Psychiatry: A & O x 3, Mood & affect appropriate    LABS:                      10.9   4.70  )-----------( 144      ( 29 Aug 2020 05:53 )             35.4     08-29    144  |  100  |  33<H>  ----------------------------<  96  3.5   |  31  |  1.47<H>    Ca    8.9      29 Aug 2020 05:53  Mg     1.8     08-29      proBNP: Serum Pro-Brain Natriuretic Peptide: 1826 pg/mL (08-28 @ 16:28)      ASSESSMENT/PLAN:

## 2020-08-30 LAB
ALBUMIN SERPL ELPH-MCNC: 4 G/DL — SIGNIFICANT CHANGE UP (ref 3.3–5)
ALP SERPL-CCNC: 103 U/L — SIGNIFICANT CHANGE UP (ref 40–120)
ALT FLD-CCNC: 16 U/L — SIGNIFICANT CHANGE UP (ref 10–45)
ANION GAP SERPL CALC-SCNC: 11 MMOL/L — SIGNIFICANT CHANGE UP (ref 5–17)
ANION GAP SERPL CALC-SCNC: 8 MMOL/L — SIGNIFICANT CHANGE UP (ref 5–17)
AST SERPL-CCNC: 22 U/L — SIGNIFICANT CHANGE UP (ref 10–40)
BILIRUB SERPL-MCNC: 2.2 MG/DL — HIGH (ref 0.2–1.2)
BUN SERPL-MCNC: 32 MG/DL — HIGH (ref 7–23)
BUN SERPL-MCNC: 32 MG/DL — HIGH (ref 7–23)
CALCIUM SERPL-MCNC: 9 MG/DL — SIGNIFICANT CHANGE UP (ref 8.4–10.5)
CALCIUM SERPL-MCNC: 9.4 MG/DL — SIGNIFICANT CHANGE UP (ref 8.4–10.5)
CHLORIDE SERPL-SCNC: 94 MMOL/L — LOW (ref 96–108)
CHLORIDE SERPL-SCNC: 96 MMOL/L — SIGNIFICANT CHANGE UP (ref 96–108)
CO2 SERPL-SCNC: 36 MMOL/L — HIGH (ref 22–31)
CO2 SERPL-SCNC: 36 MMOL/L — HIGH (ref 22–31)
CREAT SERPL-MCNC: 1.22 MG/DL — SIGNIFICANT CHANGE UP (ref 0.5–1.3)
CREAT SERPL-MCNC: 1.28 MG/DL — SIGNIFICANT CHANGE UP (ref 0.5–1.3)
GLUCOSE BLDC GLUCOMTR-MCNC: 103 MG/DL — HIGH (ref 70–99)
GLUCOSE BLDC GLUCOMTR-MCNC: 108 MG/DL — HIGH (ref 70–99)
GLUCOSE BLDC GLUCOMTR-MCNC: 80 MG/DL — SIGNIFICANT CHANGE UP (ref 70–99)
GLUCOSE BLDC GLUCOMTR-MCNC: 93 MG/DL — SIGNIFICANT CHANGE UP (ref 70–99)
GLUCOSE SERPL-MCNC: 108 MG/DL — HIGH (ref 70–99)
GLUCOSE SERPL-MCNC: 88 MG/DL — SIGNIFICANT CHANGE UP (ref 70–99)
HCT VFR BLD CALC: 37.2 % — LOW (ref 39–50)
HGB BLD-MCNC: 11.4 G/DL — LOW (ref 13–17)
MAGNESIUM SERPL-MCNC: 2 MG/DL — SIGNIFICANT CHANGE UP (ref 1.6–2.6)
MCHC RBC-ENTMCNC: 30.6 GM/DL — LOW (ref 32–36)
MCHC RBC-ENTMCNC: 33.7 PG — SIGNIFICANT CHANGE UP (ref 27–34)
MCV RBC AUTO: 110.1 FL — HIGH (ref 80–100)
NRBC # BLD: 0 /100 WBCS — SIGNIFICANT CHANGE UP (ref 0–0)
PLATELET # BLD AUTO: 144 K/UL — LOW (ref 150–400)
POTASSIUM SERPL-MCNC: 3.1 MMOL/L — LOW (ref 3.5–5.3)
POTASSIUM SERPL-MCNC: 3.7 MMOL/L — SIGNIFICANT CHANGE UP (ref 3.5–5.3)
POTASSIUM SERPL-SCNC: 3.1 MMOL/L — LOW (ref 3.5–5.3)
POTASSIUM SERPL-SCNC: 3.7 MMOL/L — SIGNIFICANT CHANGE UP (ref 3.5–5.3)
PROT SERPL-MCNC: 6.9 G/DL — SIGNIFICANT CHANGE UP (ref 6–8.3)
RBC # BLD: 3.38 M/UL — LOW (ref 4.2–5.8)
RBC # FLD: 14.5 % — SIGNIFICANT CHANGE UP (ref 10.3–14.5)
SODIUM SERPL-SCNC: 140 MMOL/L — SIGNIFICANT CHANGE UP (ref 135–145)
SODIUM SERPL-SCNC: 141 MMOL/L — SIGNIFICANT CHANGE UP (ref 135–145)
WBC # BLD: 4.4 K/UL — SIGNIFICANT CHANGE UP (ref 3.8–10.5)
WBC # FLD AUTO: 4.4 K/UL — SIGNIFICANT CHANGE UP (ref 3.8–10.5)

## 2020-08-30 RX ORDER — BUMETANIDE 0.25 MG/ML
1 INJECTION INTRAMUSCULAR; INTRAVENOUS EVERY 12 HOURS
Refills: 0 | Status: DISCONTINUED | OUTPATIENT
Start: 2020-08-30 | End: 2020-08-30

## 2020-08-30 RX ORDER — POTASSIUM CHLORIDE 20 MEQ
40 PACKET (EA) ORAL EVERY 4 HOURS
Refills: 0 | Status: COMPLETED | OUTPATIENT
Start: 2020-08-30 | End: 2020-08-30

## 2020-08-30 RX ORDER — POTASSIUM CHLORIDE 20 MEQ
40 PACKET (EA) ORAL ONCE
Refills: 0 | Status: COMPLETED | OUTPATIENT
Start: 2020-08-30 | End: 2020-08-30

## 2020-08-30 RX ORDER — BUMETANIDE 0.25 MG/ML
1 INJECTION INTRAMUSCULAR; INTRAVENOUS
Refills: 0 | Status: DISCONTINUED | OUTPATIENT
Start: 2020-08-30 | End: 2020-09-01

## 2020-08-30 RX ADMIN — Medication 40 MILLIEQUIVALENT(S): at 13:34

## 2020-08-30 RX ADMIN — BUMETANIDE 1 MILLIGRAM(S): 0.25 INJECTION INTRAMUSCULAR; INTRAVENOUS at 19:43

## 2020-08-30 RX ADMIN — Medication 80 MILLIGRAM(S): at 08:18

## 2020-08-30 RX ADMIN — ATORVASTATIN CALCIUM 40 MILLIGRAM(S): 80 TABLET, FILM COATED ORAL at 21:57

## 2020-08-30 RX ADMIN — BUDESONIDE AND FORMOTEROL FUMARATE DIHYDRATE 2 PUFF(S): 160; 4.5 AEROSOL RESPIRATORY (INHALATION) at 17:37

## 2020-08-30 RX ADMIN — Medication 50 MILLIGRAM(S): at 06:51

## 2020-08-30 RX ADMIN — Medication 40 MILLIEQUIVALENT(S): at 08:57

## 2020-08-30 RX ADMIN — Medication 50 MILLIGRAM(S): at 17:36

## 2020-08-30 RX ADMIN — RIVAROXABAN 20 MILLIGRAM(S): KIT at 17:37

## 2020-08-30 RX ADMIN — Medication 50 MICROGRAM(S): at 06:51

## 2020-08-30 RX ADMIN — Medication 40 MILLIEQUIVALENT(S): at 17:46

## 2020-08-30 RX ADMIN — BUDESONIDE AND FORMOTEROL FUMARATE DIHYDRATE 2 PUFF(S): 160; 4.5 AEROSOL RESPIRATORY (INHALATION) at 06:51

## 2020-08-30 NOTE — CHART NOTE - NSCHARTNOTEFT_GEN_A_CORE
Admitting Diagnosis:   Patient is a 80y old  Male who presents with a chief complaint of acute on chronic diastolic CHF (29 Aug 2020 16:03)      Consult: Yes [   ]  No [  x ]    Reason for Initial Nutrition Assessment: LOS       PAST MEDICAL & SURGICAL HISTORY:  Uncomplicated asthma, unspecified asthma severity  History of hypothyroidism  DM (diabetes mellitus screen)  Essential hypertension  Atrial fibrillation and flutter  S/P AV kishan ablation  History of cardioversion: 2014, 2016      Current Nutrition Order:  DASH TLC  Consistent Carbohydrate  1000mlFR    PO Intake: Good (%) [   ]  Fair (50-75%) [   ] Poor (<25%) [   ]  Please see Below     GI Issues:   BM 8/28    Pain:  none per flow sheets    Skin Integrity:  4+ edema (left leg;  right leg), Nonpitting edema (gen)  No pressure ulcers  brooklynn 20     Labs:   08-30    141  |  94<L>  |  32<H>  ----------------------------<  108<H>  3.1<L>   |  36<H>  |  1.28    Ca    9.4      30 Aug 2020 07:25  Mg     2.0     08-30    TPro  6.9  /  Alb  4.0  /  TBili  2.2<H>  /  DBili  x   /  AST  22  /  ALT  16  /  AlkPhos  103  08-30    CAPILLARY BLOOD GLUCOSE      POCT Blood Glucose.: 108 mg/dL (30 Aug 2020 06:38)  POCT Blood Glucose.: 111 mg/dL (29 Aug 2020 21:08)  POCT Blood Glucose.: 101 mg/dL (29 Aug 2020 16:49)  POCT Blood Glucose.: 197 mg/dL (29 Aug 2020 11:40)    Nutritionally Pertinent Lab Values:    Medications:  MEDICATIONS  (STANDING):  atorvastatin 40 milliGRAM(s) Oral at bedtime  budesonide  80 MICROgram(s)/formoterol 4.5 MICROgram(s) Inhaler 2 Puff(s) Inhalation two times a day  dextrose 5%. 1000 milliLiter(s) (50 mL/Hr) IV Continuous <Continuous>  dextrose 50% Injectable 12.5 Gram(s) IV Push once  dextrose 50% Injectable 25 Gram(s) IV Push once  dextrose 50% Injectable 25 Gram(s) IV Push once  furosemide   Injectable 80 milliGRAM(s) IV Push two times a day  insulin lispro (HumaLOG) corrective regimen sliding scale   SubCutaneous Before meals and at bedtime  levothyroxine 50 MICROGram(s) Oral daily  metoprolol succinate ER 50 milliGRAM(s) Oral two times a day  potassium chloride    Tablet ER 40 milliEquivalent(s) Oral every 4 hours  rivaroxaban 20 milliGRAM(s) Oral with dinner    MEDICATIONS  (PRN):  dextrose 40% Gel 15 Gram(s) Oral once PRN Blood Glucose LESS THAN 70 milliGRAM(s)/deciliter  glucagon  Injectable 1 milliGRAM(s) IntraMuscular once PRN Glucose LESS THAN 70 milligrams/deciliter      Admitted Anthropometrics:    Weight:  Daily     Daily     Weight Change:     Nutrition Focused Physical Exam: Completed [   ]  Unable to complete [   ]  Muscle Wasting:  Subcutaneous Fat Wasting:    Estimated energy needs:     Subjective:   81 y/o overweight male with history of HTN, HLD, DM, afib/flutter (hx of ablation in 2016 -- RFA and CTI), DCCV, asthma (no intubations), hypothyroid who presents today for scheduled DCCV and was found to be in heart failure with SOB, PINEDO, 3 + pitting LE edema. Pt was cardioverted and post procedure developed acute and chronic CHF. He was given Lasix 40 mg IV x 1 in the EP holding area. CXR showing pulm vascular congestion. BNP 1800. Of note, he was recently admitted to [facility] 7/28/20 with CHF exacerbation in setting of aflutter RVR. He was diuresed and evaluated by EP team. He kindly declined any intervention at that time.   Please see below for nutritions recommendations.     Nutrition Diagnosis:    [  ] No active nutrition diagnosis at this time  [  ] Current medical condition precludes nutrition intervention    Goal:    Recommendations:    Education:     Risk Level: High [   ] Moderate [   ] Low [   ] Admitting Diagnosis:   Patient is a 80y old  Male who presents with a chief complaint of acute on chronic diastolic CHF (29 Aug 2020 16:03)      Consult: Yes [   ]  No [  x ]    Reason for Initial Nutrition Assessment: LOS       PAST MEDICAL & SURGICAL HISTORY:  Uncomplicated asthma, unspecified asthma severity  History of hypothyroidism  DM (diabetes mellitus screen)  Essential hypertension  Atrial fibrillation and flutter  S/P AV kishan ablation  History of cardioversion: 2014, 2016      Current Nutrition Order:  DASH TLC  Consistent Carbohydrate  1000mlFR    PO Intake: Good (%) [   ]  Fair (50-75%) [   ] Poor (<25%) [   ]  Please see Below     GI Issues:   BM 8/28    Pain:  none per flow sheets    Skin Integrity:  4+ edema (left leg;  right leg), Nonpitting edema (gen)  No pressure ulcers  Michael 20     Labs:   08-30    141  |  94<L>  |  32<H>  ----------------------------<  108<H>  3.1<L>   |  36<H>  |  1.28    Ca    9.4      30 Aug 2020 07:25  Mg     2.0     08-30    TPro  6.9  /  Alb  4.0  /  TBili  2.2<H>  /  DBili  x   /  AST  22  /  ALT  16  /  AlkPhos  103  08-30    CAPILLARY BLOOD GLUCOSE      POCT Blood Glucose.: 108 mg/dL (30 Aug 2020 06:38)  POCT Blood Glucose.: 111 mg/dL (29 Aug 2020 21:08)  POCT Blood Glucose.: 101 mg/dL (29 Aug 2020 16:49)  POCT Blood Glucose.: 197 mg/dL (29 Aug 2020 11:40)    Nutritionally Pertinent Lab Values:  K 3.1  BUN 32, Cr WDL  Glucose 108  A1c 6.1% July 2020     Medications:  MEDICATIONS  (STANDING):  atorvastatin 40 milliGRAM(s) Oral at bedtime  budesonide  80 MICROgram(s)/formoterol 4.5 MICROgram(s) Inhaler 2 Puff(s) Inhalation two times a day  dextrose 5%. 1000 milliLiter(s) (50 mL/Hr) IV Continuous <Continuous>  dextrose 50% Injectable 12.5 Gram(s) IV Push once  dextrose 50% Injectable 25 Gram(s) IV Push once  dextrose 50% Injectable 25 Gram(s) IV Push once  furosemide   Injectable 80 milliGRAM(s) IV Push two times a day  insulin lispro (HumaLOG) corrective regimen sliding scale   SubCutaneous Before meals and at bedtime  levothyroxine 50 MICROGram(s) Oral daily  metoprolol succinate ER 50 milliGRAM(s) Oral two times a day  potassium chloride    Tablet ER 40 milliEquivalent(s) Oral every 4 hours  rivaroxaban 20 milliGRAM(s) Oral with dinner    MEDICATIONS  (PRN):  dextrose 40% Gel 15 Gram(s) Oral once PRN Blood Glucose LESS THAN 70 milliGRAM(s)/deciliter  glucagon  Injectable 1 milliGRAM(s) IntraMuscular once PRN Glucose LESS THAN 70 milligrams/deciliter      Admitted Anthropometrics:  5'11''  pounds +-10%   8/28 268 pounds   BMI 37.5  %ZHY=505%    Weight Change: Pt reports  pounds, reports his wt is likely Higher at this time d/t fluids; +18 pound wt gain based on admission wt and UBW, Edema noted at this time     Nutrition Focused Physical Exam: Completed [   ]  Unable to complete [   ]- NA     Estimated energy needs:   IBW used to calculate energy needs due to pt's current body weight exceeding 120% of IBW  Adjusted for age based on CHF - fluids per team  1950kcal/day 25kcal/kg  78-94gm/day 1.2-1.4gm/kg     Subjective:   81 y/o overweight male with history of HTN, HLD, DM, afib/flutter (hx of ablation in 2016 -- RFA and CTI), DCCV, asthma (no intubations), hypothyroid who presents today for scheduled DCCV and was found to be in heart failure with SOB, PINEDO, 3 + pitting LE edema. Pt was cardioverted and post procedure developed acute and chronic CHF. He was given Lasix 40 mg IV x 1 in the EP holding area. CXR showing pulm vascular congestion. BNP 1800. Of note, he was recently admitted to [facility] 7/28/20 with CHF exacerbation in setting of aflutter RVR. He was diuresed and evaluated by EP team. He kindly declined any intervention at that time.   Pt is known to RD from recent July 2020 admission, this RD had provided diet education during that time in which flat affect had been noted. Pt seen today, alert in room however cont with flat affect. During prior admission pt reported that wife does cooking, does not use salt, eats mostly fresh food, does have some canned items, eats sandwiches, likes pickles. During visit today, pt reports often eating fast food and take out, tends to get [employer], reason reported d/t "disruption recently" - pt reports able to do cooking/shopping however take out is easier as of recently d/t increased stress from being sick. Does report not wanting to eat things such as bread however d/t sugar content. Pt on DASH TLC consCHO diet, 1000mlFR at this time - reports he has been eating "ok" however unable to further elaborate. No issues chewing/swallowing, NKFA.   Please see below for nutritions recommendations.     Nutrition Diagnosis: Undesirable food choices RT lifestyle AEB food recall   Goal:  1. Understand value of proper diet  2. Pt to name 2 teach back points     Recommendations:  1. Cont with diet as ordered.  2. Monitor %PO intake. If intake is 50% of meals or less consider low sodium > DASH.  3. Monitor Skin, Wts daily, GI, GOC.  4. Labs: monitor BMP, CBC, glucose, lytes, trend renal indices, POCT, lfts.  5. RD to remain available for additional nutrition interventions as needed.     Education: Various aspects of CHF/DM diet education provided (verbally) - reviewed CHO examples, fluid examples, tips/ways to limit sodium when ordering/dining out. Pt noted with overall flat affect, ? How well pt to follow Recs s/p d/c.     Risk Level: High [  x ] Moderate [   ] Low [   ]

## 2020-08-30 NOTE — PROGRESS NOTE ADULT - PROBLEM SELECTOR PLAN 1
HFpEF, NYHA Class IV, ACC AHA STAGE C  - Continue diuresis with goal net negative about 1.5 to 2 L per 24 hour.  - SWITCHED Lasix to Bumex 1mg BID   - keep K > 4 and Mag > 2. Replete electrolytes as needed  - continue metoprolol succinate 50 mg po bid.  -daily weight, strict I&Os, 1 L fluid restriction HFpEF, NYHA Class IV, ACC AHA STAGE C  - Continue diuresis with goal net negative about 1.5 to 2 L per 24 hour.  - SWITCHED Lasix 80mg IV BID to Bumex 1mg IV BID  - keep K > 4 and Mag > 2. Replete electrolytes as needed  - continue metoprolol succinate 50 mg po bid.  -daily weight, strict I&Os, 1 L fluid restriction HFpEF, NYHA Class IV, ACC AHA STAGE C  - Continue diuresis with goal net negative about 1.5 to 2 L per 24 hour.  - SWITCHED Lasix 80mg IV BID to Bumex 1mg IV BID  - keep K > 4 and Mag > 2. Replete electrolytes as needed  - continue metoprolol succinate 50 mg po bid.  - no on ACE/ARB at home, consider adding if Cr stable   -daily weight, strict I&Os, 1 L fluid restriction

## 2020-08-30 NOTE — PROGRESS NOTE ADULT - ASSESSMENT
80 year old male with past medical history of hypertension, hyperlipidemia, atrial fibrillation (ablation in 2016), hypothyroidism, HFpEF who is admitted to hospital for acute on chronic heart failure exacerbation. Patient presented for scheduled Cardioversion 8/28/2020. Post cardioversion he was noticed to be hypoxic and in decompensated acute on chronic diastolic heart failure. Chest xray showed pulmonary congestion with elevated BNP of 1800. Pt started on Lasix 40mg IV BID which was increased to Lasix 80mg IV BID on 8/29 as pt's urine output not at adequate state. Pt refused BIPAP overnight from 8/28-8/29 and will attempt to use the CPAP setting overnight from 8/29-8/30. 80 year old male with past medical history of hypertension, hyperlipidemia, atrial fibrillation (ablation in 2016), hypothyroidism, HFpEF who is admitted to hospital for acute on chronic heart failure exacerbation. Patient presented for scheduled Cardioversion 8/28/2020. Post cardioversion he was noticed to be hypoxic and in decompensated acute on chronic diastolic heart failure. Chest xray showed pulmonary congestion with elevated BNP of 1800. Pt started on Lasix 40mg IV BID which was increased to Lasix 80mg IV BID on 8/29 then changed to Bumex 1mg BID on 8/30, converted back to atrial fibrillation, per Dr. Curiel will hold of repeat DCCV until patient euvolemic.

## 2020-08-30 NOTE — PROGRESS NOTE ADULT - SUBJECTIVE AND OBJECTIVE BOX
Interventional Cardiology PA Adult Progress Note    Subjective Assessment: Patient seen and examined at bedside.   	  MEDICATIONS:  buMETAnide 1 milliGRAM(s) Oral every 12 hours  metoprolol succinate ER 50 milliGRAM(s) Oral two times a day  budesonide  80 MICROgram(s)/formoterol 4.5 MICROgram(s) Inhaler 2 Puff(s) Inhalation two times a day  atorvastatin 40 milliGRAM(s) Oral at bedtime  dextrose 40% Gel 15 Gram(s) Oral once PRN  dextrose 50% Injectable 12.5 Gram(s) IV Push once  dextrose 50% Injectable 25 Gram(s) IV Push once  dextrose 50% Injectable 25 Gram(s) IV Push once  glucagon  Injectable 1 milliGRAM(s) IntraMuscular once PRN  insulin lispro (HumaLOG) corrective regimen sliding scale   SubCutaneous Before meals and at bedtime  levothyroxine 50 MICROGram(s) Oral daily  dextrose 5%. 1000 milliLiter(s) IV Continuous <Continuous>  potassium chloride    Tablet ER 40 milliEquivalent(s) Oral every 4 hours  rivaroxaban 20 milliGRAM(s) Oral with dinner      	    [PHYSICAL EXAM:  TELEMETRY:  T(C): 36.7 (08-30-20 @ 08:39), Max: 36.9 (08-29-20 @ 17:05)  HR: 78 (08-30-20 @ 10:26) (60 - 101)  BP: 134/68 (08-30-20 @ 10:26) (96/54 - 143/67)  RR: 19 (08-30-20 @ 10:26) (16 - 20)  SpO2: 95% (08-30-20 @ 10:26) (92% - 100%)  Wt(kg): --  I&O's Summary    29 Aug 2020 07:01  -  30 Aug 2020 07:00  --------------------------------------------------------  IN: 1290 mL / OUT: 2620 mL / NET: -1330 mL    30 Aug 2020 07:01  -  30 Aug 2020 10:58  --------------------------------------------------------  IN: 480 mL / OUT: 1650 mL / NET: -1170 mL        Appearance: Normal	  HEENT:   Normal oral mucosa, PERRL, EOMI	  Neck: Supple, + JVD/ - JVD; Carotid Bruit   Cardiovascular: Normal S1 S2, No JVD, No murmurs,   Respiratory: Lungs clear to auscultation/Decreased Breath Sounds/No Rales, Rhonchi, Wheezing	  Gastrointestinal:  Soft, Non-tender, + BS	  Skin: No rashes, No ecchymoses, No cyanosis  Extremities: Normal range of motion, No clubbing, cyanosis or edema  Vascular: Peripheral pulses palpable 2+ bilaterally  Neurologic: Non-focal  Psychiatry: A & O x 3, Mood & affect appropriate                            11.4   4.40  )-----------( 144      ( 30 Aug 2020 07:25 )             37.2     08-30    141  |  94<L>  |  32<H>  ----------------------------<  108<H>  3.1<L>   |  36<H>  |  1.28    Ca    9.4      30 Aug 2020 07:25  Mg     2.0     08-30    TPro  6.9  /  Alb  4.0  /  TBili  2.2<H>  /  DBili  x   /  AST  22  /  ALT  16  /  AlkPhos  103  08-30    proBNP:   Lipid Profile:   HgA1c:   TSH:       ASSESSMENT/PLAN: 	        DVT ppx:  Dispo: Interventional Cardiology PA Adult Progress Note    Subjective Assessment: Patient seen and examined at bedside. Feels some what improved. Encouraged to walk and sit in chair. Patient concerned about converting back to Afib.   	  MEDICATIONS:  buMETAnide 1 milliGRAM(s) Oral every 12 hours  metoprolol succinate ER 50 milliGRAM(s) Oral two times a day  budesonide  80 MICROgram(s)/formoterol 4.5 MICROgram(s) Inhaler 2 Puff(s) Inhalation two times a day  atorvastatin 40 milliGRAM(s) Oral at bedtime  dextrose 40% Gel 15 Gram(s) Oral once PRN  dextrose 50% Injectable 12.5 Gram(s) IV Push once  dextrose 50% Injectable 25 Gram(s) IV Push once  dextrose 50% Injectable 25 Gram(s) IV Push once  glucagon  Injectable 1 milliGRAM(s) IntraMuscular once PRN  insulin lispro (HumaLOG) corrective regimen sliding scale   SubCutaneous Before meals and at bedtime  levothyroxine 50 MICROGram(s) Oral daily  dextrose 5%. 1000 milliLiter(s) IV Continuous <Continuous>  potassium chloride    Tablet ER 40 milliEquivalent(s) Oral every 4 hours  rivaroxaban 20 milliGRAM(s) Oral with dinner      	    [PHYSICAL EXAM:  TELEMETRY:  T(C): 36.7 (08-30-20 @ 08:39), Max: 36.9 (08-29-20 @ 17:05)  HR: 78 (08-30-20 @ 10:26) (60 - 101)  BP: 134/68 (08-30-20 @ 10:26) (96/54 - 143/67)  RR: 19 (08-30-20 @ 10:26) (16 - 20)  SpO2: 95% (08-30-20 @ 10:26) (92% - 100%)  Wt(kg): --  I&O's Summary    29 Aug 2020 07:01  -  30 Aug 2020 07:00  --------------------------------------------------------  IN: 1290 mL / OUT: 2620 mL / NET: -1330 mL    30 Aug 2020 07:01  -  30 Aug 2020 10:58  --------------------------------------------------------  IN: 480 mL / OUT: 1650 mL / NET: -1170 mL        Appearance: obese	  Neck: Supple, + JVD  Cardiovascular: irregularly irregular no murmur  Respiratory: bibasilar rales  Skin: No rashes, No ecchymoses, No cyanosis  Extremities: + 3 pitting edema b/l  Vascular: Peripheral pulses palpable 2+ bilaterally  Neurologic: Non-focal  Psychiatry: A & O x 3, Mood & affect appropriate                            11.4   4.40  )-----------( 144      ( 30 Aug 2020 07:25 )             37.2     08-30    141  |  94<L>  |  32<H>  ----------------------------<  108<H>  3.1<L>   |  36<H>  |  1.28    Ca    9.4      30 Aug 2020 07:25  Mg     2.0     08-30    TPro  6.9  /  Alb  4.0  /  TBili  2.2<H>  /  DBili  x   /  AST  22  /  ALT  16  /  AlkPhos  103  08-30

## 2020-08-31 DIAGNOSIS — I27.20 PULMONARY HYPERTENSION, UNSPECIFIED: ICD-10-CM

## 2020-08-31 LAB
ANION GAP SERPL CALC-SCNC: 12 MMOL/L — SIGNIFICANT CHANGE UP (ref 5–17)
BUN SERPL-MCNC: 32 MG/DL — HIGH (ref 7–23)
CALCIUM SERPL-MCNC: 9.3 MG/DL — SIGNIFICANT CHANGE UP (ref 8.4–10.5)
CHLORIDE SERPL-SCNC: 98 MMOL/L — SIGNIFICANT CHANGE UP (ref 96–108)
CO2 SERPL-SCNC: 34 MMOL/L — HIGH (ref 22–31)
CREAT SERPL-MCNC: 1.3 MG/DL — SIGNIFICANT CHANGE UP (ref 0.5–1.3)
GLUCOSE BLDC GLUCOMTR-MCNC: 101 MG/DL — HIGH (ref 70–99)
GLUCOSE BLDC GLUCOMTR-MCNC: 101 MG/DL — HIGH (ref 70–99)
GLUCOSE BLDC GLUCOMTR-MCNC: 105 MG/DL — HIGH (ref 70–99)
GLUCOSE BLDC GLUCOMTR-MCNC: 115 MG/DL — HIGH (ref 70–99)
GLUCOSE SERPL-MCNC: 106 MG/DL — HIGH (ref 70–99)
HCT VFR BLD CALC: 36.6 % — LOW (ref 39–50)
HGB BLD-MCNC: 11.4 G/DL — LOW (ref 13–17)
MAGNESIUM SERPL-MCNC: 2 MG/DL — SIGNIFICANT CHANGE UP (ref 1.6–2.6)
MCHC RBC-ENTMCNC: 31.1 GM/DL — LOW (ref 32–36)
MCHC RBC-ENTMCNC: 34 PG — SIGNIFICANT CHANGE UP (ref 27–34)
MCV RBC AUTO: 109.3 FL — HIGH (ref 80–100)
NRBC # BLD: 0 /100 WBCS — SIGNIFICANT CHANGE UP (ref 0–0)
PLATELET # BLD AUTO: 155 K/UL — SIGNIFICANT CHANGE UP (ref 150–400)
POTASSIUM SERPL-MCNC: 3.4 MMOL/L — LOW (ref 3.5–5.3)
POTASSIUM SERPL-SCNC: 3.4 MMOL/L — LOW (ref 3.5–5.3)
RBC # BLD: 3.35 M/UL — LOW (ref 4.2–5.8)
RBC # FLD: 14.4 % — SIGNIFICANT CHANGE UP (ref 10.3–14.5)
SODIUM SERPL-SCNC: 144 MMOL/L — SIGNIFICANT CHANGE UP (ref 135–145)
WBC # BLD: 4.21 K/UL — SIGNIFICANT CHANGE UP (ref 3.8–10.5)
WBC # FLD AUTO: 4.21 K/UL — SIGNIFICANT CHANGE UP (ref 3.8–10.5)

## 2020-08-31 PROCEDURE — 93010 ELECTROCARDIOGRAM REPORT: CPT

## 2020-08-31 PROCEDURE — 93306 TTE W/DOPPLER COMPLETE: CPT | Mod: 26

## 2020-08-31 PROCEDURE — 71045 X-RAY EXAM CHEST 1 VIEW: CPT | Mod: 26

## 2020-08-31 RX ORDER — METOPROLOL TARTRATE 50 MG
25 TABLET ORAL ONCE
Refills: 0 | Status: COMPLETED | OUTPATIENT
Start: 2020-08-31 | End: 2020-08-31

## 2020-08-31 RX ORDER — SENNA PLUS 8.6 MG/1
2 TABLET ORAL ONCE
Refills: 0 | Status: COMPLETED | OUTPATIENT
Start: 2020-08-31 | End: 2020-08-31

## 2020-08-31 RX ORDER — DILTIAZEM HCL 120 MG
60 CAPSULE, EXT RELEASE 24 HR ORAL EVERY 6 HOURS
Refills: 0 | Status: DISCONTINUED | OUTPATIENT
Start: 2020-08-31 | End: 2020-08-31

## 2020-08-31 RX ORDER — POTASSIUM CHLORIDE 20 MEQ
40 PACKET (EA) ORAL EVERY 4 HOURS
Refills: 0 | Status: COMPLETED | OUTPATIENT
Start: 2020-08-31 | End: 2020-08-31

## 2020-08-31 RX ORDER — METOPROLOL TARTRATE 50 MG
100 TABLET ORAL EVERY 12 HOURS
Refills: 0 | Status: DISCONTINUED | OUTPATIENT
Start: 2020-08-31 | End: 2020-09-01

## 2020-08-31 RX ORDER — NIFEDIPINE 30 MG
30 TABLET, EXTENDED RELEASE 24 HR ORAL DAILY
Refills: 0 | Status: DISCONTINUED | OUTPATIENT
Start: 2020-08-31 | End: 2020-09-01

## 2020-08-31 RX ORDER — METOPROLOL TARTRATE 50 MG
5 TABLET ORAL ONCE
Refills: 0 | Status: DISCONTINUED | OUTPATIENT
Start: 2020-08-31 | End: 2020-08-31

## 2020-08-31 RX ADMIN — Medication 50 MICROGRAM(S): at 06:41

## 2020-08-31 RX ADMIN — BUMETANIDE 1 MILLIGRAM(S): 0.25 INJECTION INTRAMUSCULAR; INTRAVENOUS at 18:17

## 2020-08-31 RX ADMIN — Medication 40 MILLIEQUIVALENT(S): at 09:45

## 2020-08-31 RX ADMIN — Medication 100 MILLIGRAM(S): at 18:16

## 2020-08-31 RX ADMIN — Medication 40 MILLIEQUIVALENT(S): at 14:28

## 2020-08-31 RX ADMIN — ATORVASTATIN CALCIUM 40 MILLIGRAM(S): 80 TABLET, FILM COATED ORAL at 21:03

## 2020-08-31 RX ADMIN — Medication 60 MILLIGRAM(S): at 11:05

## 2020-08-31 RX ADMIN — BUMETANIDE 1 MILLIGRAM(S): 0.25 INJECTION INTRAMUSCULAR; INTRAVENOUS at 06:41

## 2020-08-31 RX ADMIN — RIVAROXABAN 20 MILLIGRAM(S): KIT at 18:17

## 2020-08-31 RX ADMIN — Medication 25 MILLIGRAM(S): at 00:56

## 2020-08-31 RX ADMIN — Medication 30 MILLIGRAM(S): at 21:03

## 2020-08-31 RX ADMIN — Medication 50 MILLIGRAM(S): at 06:41

## 2020-08-31 RX ADMIN — BUDESONIDE AND FORMOTEROL FUMARATE DIHYDRATE 2 PUFF(S): 160; 4.5 AEROSOL RESPIRATORY (INHALATION) at 06:43

## 2020-08-31 RX ADMIN — SENNA PLUS 2 TABLET(S): 8.6 TABLET ORAL at 07:40

## 2020-08-31 RX ADMIN — BUDESONIDE AND FORMOTEROL FUMARATE DIHYDRATE 2 PUFF(S): 160; 4.5 AEROSOL RESPIRATORY (INHALATION) at 18:16

## 2020-08-31 NOTE — PROGRESS NOTE ADULT - SUBJECTIVE AND OBJECTIVE BOX
EPS Progress Note    S: Pt seen and examined at bedside. Reports feeling okay. Less short of breath since last week. Denies chest pain, lightheadedness, dizziness, cough, fever, chills.    O: Reverted back to Afib yesterday. Net negative 2.8L over last 24hrs. CXR w/ decreased L pleural effusion and decreased pulm vasc congestion.    Telemetry: Afib 90-120s, episodes of Afib RVR w/ HR 140s             General:  NAD, morbidly obese  HEENT:   Normal oral mucosa   Neck: Supple, + JVD; No Carotid Bruits   Cardiovascular: Normal S1 S2, No JVD, No murmurs   Respiratory: Lungs w/ decreased Breath Sounds L side, R side mild crackles at base	  Gastrointestinal:  Soft, Non-tender, + BS	  Skin: No rashes, No ecchymoses, No cyanosis  Extremities: Normal range of motion, No clubbing, cyanosis. Elkin 1+ LE edema extending to thighs  Vascular: Peripheral pulses palpable 2+ bilaterally  Neurologic: Non-focal  Psychiatry: A & O x 3, Mood & affect appropriate	      MEDICATIONS  (STANDING):  atorvastatin 40 milliGRAM(s) Oral at bedtime  budesonide  80 MICROgram(s)/formoterol 4.5 MICROgram(s) Inhaler 2 Puff(s) Inhalation two times a day  buMETAnide Injectable 1 milliGRAM(s) IV Push two times a day  dextrose 5%. 1000 milliLiter(s) (50 mL/Hr) IV Continuous <Continuous>  dextrose 50% Injectable 12.5 Gram(s) IV Push once  dextrose 50% Injectable 25 Gram(s) IV Push once  dextrose 50% Injectable 25 Gram(s) IV Push once  insulin lispro (HumaLOG) corrective regimen sliding scale   SubCutaneous Before meals and at bedtime  levothyroxine 50 MICROGram(s) Oral daily  metoprolol succinate  milliGRAM(s) Oral every 12 hours  NIFEdipine XL 30 milliGRAM(s) Oral daily  potassium chloride    Tablet ER 40 milliEquivalent(s) Oral every 4 hours  rivaroxaban 20 milliGRAM(s) Oral with dinner    MEDICATIONS  (PRN):  dextrose 40% Gel 15 Gram(s) Oral once PRN Blood Glucose LESS THAN 70 milliGRAM(s)/deciliter  glucagon  Injectable 1 milliGRAM(s) IntraMuscular once PRN Glucose LESS THAN 70 milligrams/deciliter         Labs:                                                               11.4   4.21  )-----------( 155      ( 31 Aug 2020 05:56 )             36.6     08-31    144  |  98  |  32<H>  ----------------------------<  106<H>  3.4<L>   |  34<H>  |  1.30    Ca    9.3      31 Aug 2020 05:56  Mg     2.0     08-31    TPro  6.9  /  Alb  4.0  /  TBili  2.2<H>  /  DBili  x   /  AST  22  /  ALT  16  /  AlkPhos  103  08-30        Assessment/Plan: EPS Progress Note    S: Pt seen and examined at bedside. Reports feeling okay. Less short of breath since last week. Denies chest pain, lightheadedness, dizziness, cough, fever, chills.    ON: Reverted back to Afib yesterday. Net negative 2.8L over last 24hrs. CXR w/ decreased L pleural effusion and decreased pulm vasc congestion. Refused BIPAP at night    Telemetry: Afib 90-120s, episodes of Afib RVR w/ HR 140s            General:  NAD, morbidly obese  HEENT:   Normal oral mucosa   Neck: Supple, + JVD; No Carotid Bruits   Cardiovascular: Normal S1 S2, No JVD, No murmurs   Respiratory: Lungs w/ decreased Breath Sounds L side, R side mild crackles at base	  Gastrointestinal:  Soft, Non-tender, + BS	  Skin: No rashes, No ecchymoses, No cyanosis  Extremities: Normal range of motion, No clubbing, cyanosis. Elkin 1+ LE edema extending to thighs  Vascular: Peripheral pulses palpable 2+ bilaterally  Neurologic: Non-focal  Psychiatry: A & O x 3, Mood & affect appropriate	      MEDICATIONS  (STANDING):  atorvastatin 40 milliGRAM(s) Oral at bedtime  budesonide  80 MICROgram(s)/formoterol 4.5 MICROgram(s) Inhaler 2 Puff(s) Inhalation two times a day  buMETAnide Injectable 1 milliGRAM(s) IV Push two times a day  dextrose 5%. 1000 milliLiter(s) (50 mL/Hr) IV Continuous <Continuous>  dextrose 50% Injectable 12.5 Gram(s) IV Push once  dextrose 50% Injectable 25 Gram(s) IV Push once  dextrose 50% Injectable 25 Gram(s) IV Push once  insulin lispro (HumaLOG) corrective regimen sliding scale   SubCutaneous Before meals and at bedtime  levothyroxine 50 MICROGram(s) Oral daily  metoprolol succinate  milliGRAM(s) Oral every 12 hours  NIFEdipine XL 30 milliGRAM(s) Oral daily  potassium chloride    Tablet ER 40 milliEquivalent(s) Oral every 4 hours  rivaroxaban 20 milliGRAM(s) Oral with dinner    MEDICATIONS  (PRN):  dextrose 40% Gel 15 Gram(s) Oral once PRN Blood Glucose LESS THAN 70 milliGRAM(s)/deciliter  glucagon  Injectable 1 milliGRAM(s) IntraMuscular once PRN Glucose LESS THAN 70 milligrams/deciliter         Labs:                                                               11.4   4.21  )-----------( 155      ( 31 Aug 2020 05:56 )             36.6     08-31    144  |  98  |  32<H>  ----------------------------<  106<H>  3.4<L>   |  34<H>  |  1.30    Ca    9.3      31 Aug 2020 05:56  Mg     2.0     08-31    TPro  6.9  /  Alb  4.0  /  TBili  2.2<H>  /  DBili  x   /  AST  22  /  ALT  16  /  AlkPhos  103  08-30          Assessment/Plan:  < from: TTE Echo Complete w/o Contrast w/ Doppler (07.29.20 @ 15:12) >  CONCLUSIONS:     1. Normal left ventricular size and function.   2. Mild symmetric left ventricular hypertrophy.   3. Mildly dilated right ventricular size.   4. Normal right ventricular systolic function.   5. Severely dilated right atrium.   6. Aortic sclerosis without significant stenosis.   7. Pulmonary hypertension present, pulmonary artery systolic pressure is 66 mmHg.   8. No pericardial effusion.    < end of copied text >      < from: TTE Echo Complete w/o Contrast w/ Doppler (08.31.20 @ 12:32) >  CONCLUSIONS:     1. Normal left ventricular size and function.   2. Moderately-to-severely dilated right ventricular size.   3. Normal right ventricular systolic function.   4. Dilated right atrium.   5. Mild-to-moderate tricuspid regurgitation.   6. Pulmonary hypertension present, pulmonary artery systolic pressure is 64 mmHg.   7. No pericardial effusion.    < end of copied text >    < from: Xray Chest 1 View- PORTABLE-Urgent (08.31.20 @ 09:44) >  Findings/  impression: Stable heart size, thoracic aortic calcification. Pulmonary arterial hypertension. Left basilar opacity/pleural effusion and right basilar opacity, unchanged.. Stable bony structures.    < end of copied text >

## 2020-08-31 NOTE — PROGRESS NOTE ADULT - ASSESSMENT
80 year old male with past medical history of hypertension, hyperlipidemia, atrial fibrillation (ablation in 2016), hypothyroidism, HFpEF who is admitted to hospital for acute on chronic heart failure exacerbation. Patient presented for scheduled Cardioversion 8/28/2020. Post cardioversion he was noticed to be hypoxic and in decompensated acute on chronic diastolic heart failure. CXR showed pulmonary congestion with elevated BNP of 1800. Pt started on IV diuresis: Lasix 40mg IV BID which was increased to Lasix 80mg IV BID on 8/29 then changed to Bumex 1mg BID on 8/30. Pt reverted back to atrial fibrillation on 8/30, resumed on home dose Metoprolol succinate 100mg BID, added Nidefipine XL 30mg qd for severe pulm HTN management on Echo.

## 2020-08-31 NOTE — PROGRESS NOTE ADULT - PROBLEM SELECTOR PLAN 2
s/p DCCV on 8/28  - converted to Afib 8/30 AM, rate controlled   - continue metoprolol succinate 50 mg po bid  - continue Xarelto 20 mg daily.  -per Dr. Curiel will hold off repeat DCCV until euvolemic
s/p DCCV on 8/28  - currently maintaining sinus rhythm.  - continue metoprolol succinate 50 mg po bid  - continue Xarelto 20 mg daily.
s/p DCCV on 8/28  - converted to Afib 8/30 AM, HR 90-120s, few episodes of 140s on exertion  - Increase back to home dose Metoprolol Succinate 100mg PO BID for rate control strategy  - continue Xarelto 20 mg daily  -  Would not reattempt DCCV at this point as pt would likely not stay in sinus rhythm given severe Pulm HTN as discussed w/ Dr Calero

## 2020-08-31 NOTE — PROGRESS NOTE ADULT - PROBLEM SELECTOR PLAN 3
- Pt refused Bipap, will attempt CPAP on 8/29 PM    VTE PPX: Xarelto  Dispo: pending diuresis  case discussed with Dr. Curiel
- Pt refused Bipap, will attempt CPAP on 8/29 PM    VTE PPX: Xarelto  Dispo: pending diuresis
Echo noting severe pulm HTN, PASP 64mmHg w/ RV dilatation. 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  - Pt follows up w/ Pulmonologist Dr Mancuso as outpatient for pulm HTN   -Recent CT Chest 7/2020 negative for central PE  -Recent VQ Scan 7/2020 negative for CTEPH. Though noting a large area of decreased ventilation and perfusion involving the lower portion of the left lung, apparently due to consolidation and/or pleural effusion.  -Pending outpatient sleep study per pt. Encouraged pt to schedule test as outpt, pt agrees  -Likely has undiagnosed SHARONA given pt's body habitus and desaturation to 80s during sleeping per RN  -Trial CPAP inhospital for possible SHARONA, though pt have refused BIPAP at night while inhospital  - Started Nifedipine XL 30mg qd per Dr Cali  - c/w IV diuresis

## 2020-08-31 NOTE — PROGRESS NOTE ADULT - PROBLEM SELECTOR PLAN 1
HFpEF, NYHA Class IV, ACC AHA STAGE C  - Continue diuresis with goal net negative about 1.5 to 2 L per 24 hour.  - c/w Bumex 1mg IV BID  - Wean off O2 as tolerates  - keep K > 4 and Mg > 2. Replete electrolytes as needed  - Increase Metoprolol Succinate 50mg PO BID, back to home dose 100mg BID  - Start Nifedipine XL 30mg QD for severe pulm HTN on Echo  -daily weight, strict I&Os, 1 L fluid restriction

## 2020-08-31 NOTE — PROGRESS NOTE ADULT - SUBJECTIVE AND OBJECTIVE BOX
81 y/o male hypothyroid hx ablation aflutter tolerated dccv for atrial flutter now with recurrent a flutter fluid  overload on diuretics/beta blocker hemodynamically stable lungs good air entry heart s1s2 2/6 sirena lpsb and apex bp 130/80 will require repeat ablation discussed with all concerned

## 2020-09-01 ENCOUNTER — TRANSCRIPTION ENCOUNTER (OUTPATIENT)
Age: 80
End: 2020-09-01

## 2020-09-01 VITALS
RESPIRATION RATE: 18 BRPM | SYSTOLIC BLOOD PRESSURE: 95 MMHG | DIASTOLIC BLOOD PRESSURE: 51 MMHG | HEART RATE: 86 BPM | OXYGEN SATURATION: 95 %

## 2020-09-01 LAB
ANION GAP SERPL CALC-SCNC: 10 MMOL/L — SIGNIFICANT CHANGE UP (ref 5–17)
BUN SERPL-MCNC: 32 MG/DL — HIGH (ref 7–23)
CALCIUM SERPL-MCNC: 9.6 MG/DL — SIGNIFICANT CHANGE UP (ref 8.4–10.5)
CHLORIDE SERPL-SCNC: 96 MMOL/L — SIGNIFICANT CHANGE UP (ref 96–108)
CO2 SERPL-SCNC: 37 MMOL/L — HIGH (ref 22–31)
CREAT SERPL-MCNC: 1.26 MG/DL — SIGNIFICANT CHANGE UP (ref 0.5–1.3)
GLUCOSE BLDC GLUCOMTR-MCNC: 122 MG/DL — HIGH (ref 70–99)
GLUCOSE BLDC GLUCOMTR-MCNC: 137 MG/DL — HIGH (ref 70–99)
GLUCOSE SERPL-MCNC: 111 MG/DL — HIGH (ref 70–99)
HCT VFR BLD CALC: 39.9 % — SIGNIFICANT CHANGE UP (ref 39–50)
HGB BLD-MCNC: 12.3 G/DL — LOW (ref 13–17)
MAGNESIUM SERPL-MCNC: 2 MG/DL — SIGNIFICANT CHANGE UP (ref 1.6–2.6)
MCHC RBC-ENTMCNC: 30.8 GM/DL — LOW (ref 32–36)
MCHC RBC-ENTMCNC: 33.3 PG — SIGNIFICANT CHANGE UP (ref 27–34)
MCV RBC AUTO: 108.1 FL — HIGH (ref 80–100)
NRBC # BLD: 0 /100 WBCS — SIGNIFICANT CHANGE UP (ref 0–0)
PLATELET # BLD AUTO: 159 K/UL — SIGNIFICANT CHANGE UP (ref 150–400)
POTASSIUM SERPL-MCNC: 3.7 MMOL/L — SIGNIFICANT CHANGE UP (ref 3.5–5.3)
POTASSIUM SERPL-SCNC: 3.7 MMOL/L — SIGNIFICANT CHANGE UP (ref 3.5–5.3)
RBC # BLD: 3.69 M/UL — LOW (ref 4.2–5.8)
RBC # FLD: 14.4 % — SIGNIFICANT CHANGE UP (ref 10.3–14.5)
SODIUM SERPL-SCNC: 143 MMOL/L — SIGNIFICANT CHANGE UP (ref 135–145)
WBC # BLD: 4.26 K/UL — SIGNIFICANT CHANGE UP (ref 3.8–10.5)
WBC # FLD AUTO: 4.26 K/UL — SIGNIFICANT CHANGE UP (ref 3.8–10.5)

## 2020-09-01 PROCEDURE — 71045 X-RAY EXAM CHEST 1 VIEW: CPT | Mod: 26

## 2020-09-01 PROCEDURE — 36415 COLL VENOUS BLD VENIPUNCTURE: CPT

## 2020-09-01 PROCEDURE — 94640 AIRWAY INHALATION TREATMENT: CPT

## 2020-09-01 PROCEDURE — 93005 ELECTROCARDIOGRAM TRACING: CPT

## 2020-09-01 PROCEDURE — 80048 BASIC METABOLIC PNL TOTAL CA: CPT

## 2020-09-01 PROCEDURE — 80053 COMPREHEN METABOLIC PANEL: CPT

## 2020-09-01 PROCEDURE — 82962 GLUCOSE BLOOD TEST: CPT

## 2020-09-01 PROCEDURE — 93306 TTE W/DOPPLER COMPLETE: CPT

## 2020-09-01 PROCEDURE — 87635 SARS-COV-2 COVID-19 AMP PRB: CPT

## 2020-09-01 PROCEDURE — 94660 CPAP INITIATION&MGMT: CPT

## 2020-09-01 PROCEDURE — 99283 EMERGENCY DEPT VISIT LOW MDM: CPT

## 2020-09-01 PROCEDURE — 83735 ASSAY OF MAGNESIUM: CPT

## 2020-09-01 PROCEDURE — 83880 ASSAY OF NATRIURETIC PEPTIDE: CPT

## 2020-09-01 PROCEDURE — 71045 X-RAY EXAM CHEST 1 VIEW: CPT

## 2020-09-01 PROCEDURE — 86769 SARS-COV-2 COVID-19 ANTIBODY: CPT

## 2020-09-01 PROCEDURE — 85027 COMPLETE CBC AUTOMATED: CPT

## 2020-09-01 PROCEDURE — 99232 SBSQ HOSP IP/OBS MODERATE 35: CPT

## 2020-09-01 RX ORDER — FUROSEMIDE 40 MG
1 TABLET ORAL
Qty: 60 | Refills: 0
Start: 2020-09-01 | End: 2020-09-30

## 2020-09-01 RX ORDER — NIFEDIPINE 30 MG
1 TABLET, EXTENDED RELEASE 24 HR ORAL
Qty: 30 | Refills: 0
Start: 2020-09-01 | End: 2020-09-30

## 2020-09-01 RX ORDER — POTASSIUM CHLORIDE 20 MEQ
40 PACKET (EA) ORAL ONCE
Refills: 0 | Status: COMPLETED | OUTPATIENT
Start: 2020-09-01 | End: 2020-09-01

## 2020-09-01 RX ADMIN — Medication 40 MILLIEQUIVALENT(S): at 10:22

## 2020-09-01 RX ADMIN — BUDESONIDE AND FORMOTEROL FUMARATE DIHYDRATE 2 PUFF(S): 160; 4.5 AEROSOL RESPIRATORY (INHALATION) at 05:33

## 2020-09-01 RX ADMIN — Medication 100 MILLIGRAM(S): at 05:33

## 2020-09-01 RX ADMIN — Medication 50 MICROGRAM(S): at 05:32

## 2020-09-01 RX ADMIN — BUMETANIDE 1 MILLIGRAM(S): 0.25 INJECTION INTRAMUSCULAR; INTRAVENOUS at 05:33

## 2020-09-01 NOTE — DISCHARGE NOTE PROVIDER - NSDCCPCAREPLAN_GEN_ALL_CORE_FT
PRINCIPAL DISCHARGE DIAGNOSIS  Diagnosis: Acute on chronic diastolic congestive heart failure  Assessment and Plan of Treatment: Acute on chronic diastolic congestive heart failure      SECONDARY DISCHARGE DIAGNOSES  Diagnosis: Atrial fibrillation  Assessment and Plan of Treatment:

## 2020-09-01 NOTE — DISCHARGE NOTE PROVIDER - HOSPITAL COURSE
80 year old male with past medical history of hypertension, hyperlipidemia, atrial fibrillation (ablation in 2016), hypothyroidism, HFpEF who is admitted to hospital for acute on chronic heart failure exacerbation. Patient initially presented for scheduled Cardioversion 8/28/2020. Post cardioversion he was noticed to be hypoxic and in decompensated acute on chronic diastolic heart failure. CXR showed pulmonary congestion with elevated BNP of 1800. Pt started on IV diuresis: Lasix 40mg IV BID which was increased to Lasix 80mg IV BID on 8/29 then changed to Bumex 1mg BID on 8/30. Pt reverted back to atrial fibrillation on 8/30, resumed on home dose Metoprolol succinate 100mg BID, added Nidefipine XL 30mg qd for severe pulm HTN management on Echo. No plan for cardioversion of AFIB at this time due to severe pulm HTN. His rate is controlled with  Toprol (VR 90s). He was also started on BIPAP at night for presumed SHARONA given desat during sleep and also body habitus. However, pt refused BiPAP. 80 year old male with past medical history of hypertension, hyperlipidemia, atrial fibrillation (ablation in 2016), hypothyroidism, HFpEF who is admitted to hospital for acute on chronic heart failure exacerbation. Patient initially presented for scheduled Cardioversion 8/28/2020. Post cardioversion he was noticed to be hypoxic and in decompensated acute on chronic diastolic heart failure. CXR showed pulmonary congestion with elevated BNP of 1800. Pt started on IV diuresis: Lasix 40mg IV BID which was increased to Lasix 80mg IV BID on 8/29 then changed to Bumex 1mg BID on 8/30. Pt reverted back to atrial fibrillation on 8/30, resumed on home dose Metoprolol succinate 100mg BID, added Nidefipine XL 30mg qd for severe pulm HTN management on Echo. No plan for cardioversion of AFIB at this time due to severe pulm HTN. His rate is controlled with  Toprol (VR 90s). He was also started on BIPAP at night for presumed SHARONA given desat during sleep and also body habitus. However, pt refused BiPAP here but is willing to try a portable one at home if warranted in the future.     He was seen today and reports feeling better.  Telemetry shows aflutter with VR 60-120s (mostly in the 80s). Net neg 2.5 liters past 24 hours.      Exam:     Gen: NAD.     Pulm: Decreased BS left lung. No rales or wheeze    CVS: S1/S2 normal, irregular    Abd: + BS, soft, NT/ND    Ext: pitting LE edema bilateral (slightly less than when admitted)    Neuro: AAO x 3

## 2020-09-01 NOTE — DISCHARGE NOTE PROVIDER - PROVIDER TOKENS
PROVIDER:[TOKEN:[[id number]],SCHEDULEDAPPT:[09/23/2020],SCHEDULEDAPPTTIME:[11:00 AM],ESTABLISHEDPATIENT:[T]],PROVIDER:[TOKEN:[[id number]],ESTABLISHEDPATIENT:[T]],PROVIDER:[TOKEN:[[id number]]]

## 2020-09-01 NOTE — DISCHARGE NOTE PROVIDER - NSDCFUADDINST_GEN_ALL_CORE_FT
- You came in for an elective cardioversion of atrial flutter. However, after cardioversion you were noted to have acute on chronic heart failure exacerbation. You were admitted for diuresis.  Echocardiogram showed severe pulmonary hypertension for which Nifedipine was started. During this admission, you converted to atrial fibrillation on 8/30/20.    - Continue your blood thinner (xarelto).   - Continue Lasix 80 mg twice daily for now. You will need to continue your Potassium supplement while on high dose Lasix.   - Continue Metoprolol  mg twice daily.   - I sent a prescription for Nifedipine to your pharmacy. --- Once you are done with this month supply, please ask your pharmacy to get refill from Dr. Cali's office.   - You need to follow up with Dr. Gomez in 1 week to evaluate your breathing and if need to decrease on the water pill (lasix).    - I made appointment for you to see Dr Calero on 9/23 (weds) at 11 AM -- office address noted above.   - Please see Pulmonologist Dr Mancuso to evaluate for sleep apnea and to help with management of Pulmonary Hypertension.

## 2020-09-01 NOTE — DISCHARGE NOTE NURSING/CASE MANAGEMENT/SOCIAL WORK - PATIENT PORTAL LINK FT
You can access the FollowMyHealth Patient Portal offered by [facility] by registering at the following website: [email or web address]. By joining [employer]’s FollowMyHealth portal, you will also be able to view your health information using other applications (apps) compatible with our system.

## 2020-09-01 NOTE — DISCHARGE NOTE PROVIDER - NSDCFUSCHEDAPPT_GEN_ALL_CORE_FT
VINCENT MEDINA ; 09/10/2020 ; [facility] PreAdmits  VINCENT MEDINA ; 10/14/2020 ; NPP Cardio Vasc [address] VINCENT MEDINA ; 09/10/2020 ; [facility] PreAdmits  VINCENT MEDINA ; 09/23/2020 ; NPP Cardio Vasc [address]

## 2020-09-01 NOTE — DISCHARGE NOTE PROVIDER - CARE PROVIDER_API CALL
Dmitriy Calero)  Cardiac Electrophysiology  [address], [address] [place] [address]  Phone: [phone]  Fax: [phone]  Established Patient  Scheduled Appointment: 09/23/2020 11:00 AM    Tha Cali  INTERNAL MEDICINE  [address] [place] [address]  Phone: [phone]  Fax: [phone]  Established Patient  Follow Up Time:     Joni Mancuso  CRITICAL CARE MEDICINE  [facility] [address] [place] [address], [address]  [place] [address]  Phone: [phone]  Fax: [phone]  Follow Up Time:

## 2020-09-01 NOTE — DISCHARGE NOTE PROVIDER - NSDCMRMEDTOKEN_GEN_ALL_CORE_FT
Advair Diskus 250 mcg-50 mcg inhalation powder: 1 puff(s) inhaled 2 times a day  atorvastatin 40 mg oral tablet: 1 tab(s) orally once a day  furosemide 40 mg oral tablet: 1 tab(s) orally 2 times a day   levalbuterol 0.63 mg/3 mL inhalation solution: 3 milliliter(s) inhaled every 6 hours, As needed, SOB/wheezing  levalbuterol 0.63 mg/3 mL inhalation solution: 3 milliliter(s) inhaled 3 times a day, As Needed  levothyroxine 50 mcg (0.05 mg) oral capsule: 1 cap(s) orally once a day  metFORMIN 850 mg oral tablet: 1 tab(s) orally 2 times a day  metoprolol succinate 100 mg oral tablet, extended release: 1 tab(s) orally 2 times a day  Xarelto 20 mg oral tablet: 1 tab(s) orally once a day (in the evening) Advair Diskus 250 mcg-50 mcg inhalation powder: 1 puff(s) inhaled 2 times a day  atorvastatin 40 mg oral tablet: 1 tab(s) orally once a day  Klor-Con: 40 milliequivalent(s) orally once a day    Lasix 80 mg oral tablet: 1 tab(s) orally 2 times a day  levalbuterol 0.63 mg/3 mL inhalation solution: 3 milliliter(s) inhaled 3 times a day, As Needed  levothyroxine 50 mcg (0.05 mg) oral capsule: 1 cap(s) orally once a day  metFORMIN 750 mg oral tablet, extended release: 2 tab(s) orally once a day  metoprolol succinate 100 mg oral tablet, extended release: 1 tab(s) orally 2 times a day  NIFEdipine 30 mg oral tablet, extended release: 1 tab(s) orally once a day   ** future refill to be given by Dr. Cali  Xarelto 20 mg oral tablet: 1 tab(s) orally once a day (in the evening)

## 2020-09-01 NOTE — PROGRESS NOTE ADULT - PROVIDER SPECIALTY LIST ADULT
Cardiology
Cardiology
Electrophysiology
Electrophysiology
Intervent Cardiology
Pulmonology
Cardiology

## 2020-09-01 NOTE — PROGRESS NOTE ADULT - SUBJECTIVE AND OBJECTIVE BOX
CC/ HPI Patient is a 80 year old male with asthma, no hospital visits, history of atrial fib/flutter,  ablation,  w/recurrent atrial fib/flutter, congestive heart failure, admitted status post cardioversion, seen this morning the patient denies acute respiratory complaint.           PAST MEDICAL & SURGICAL HISTORY:  Uncomplicated asthma, unspecified asthma severity  History of hypothyroidism  DM (diabetes mellitus screen)  Essential hypertension  Atrial fibrillation and flutter  S/P AV kishan ablation  History of cardioversion: 2014, 2016    SOCHX:  - tobacco,  -  alcohol        FAMILY HISTORY: FA/MO  - contributory   No pertinent family history in first degree relatives    ROS reviewed below with positive findings marked (+) :  GEN:  fever, chills ENT: tracheostomy,   epistaxis,  sinusitis COR: CAD, CHF,  +HTN, +dysrhythmia PUL: COPD, ILD, +asthma,+ pneumonia GI: PEG, dysphagia, hemorrhage, other ERICKA: kidney disease, electrolyte disorder HEM:  anemia, thrombus, coagulopathy, cancer ENDO:  thyroid disease, +diabetes mellitus CNS:  dementia, stroke, seizure, PSY:  depression, anxiety, other    MEDICATIONS  (STANDING):  atorvastatin 40 milliGRAM(s) Oral at bedtime  budesonide  80 MICROgram(s)/formoterol 4.5 MICROgram(s) Inhaler 2 Puff(s) Inhalation two times a day  buMETAnide Injectable 1 milliGRAM(s) IV Push two times a day  insulin lispro (HumaLOG) corrective regimen sliding scale   SubCutaneous Before meals and at bedtime  levothyroxine 50 MICROGram(s) Oral daily  metoprolol succinate  milliGRAM(s) Oral every 12 hours  NIFEdipine XL 30 milliGRAM(s) Oral daily  rivaroxaban 20 milliGRAM(s) Oral with dinner    MEDICATIONS  (PRN):  dextrose 40% Gel 15 Gram(s) Oral once PRN Blood Glucose LESS THAN 70 milliGRAM(s)/deciliter  glucagon  Injectable 1 milliGRAM(s) IntraMuscular once PRN Glucose LESS THAN 70 milligrams/deciliter      Vital Signs Last 24 Hrs  T(C): 36.6 (01 Sep 2020 08:45), Max: 37.1 (31 Aug 2020 22:27)  T(F): 97.8 (01 Sep 2020 08:45), Max: 98.7 (31 Aug 2020 22:27)  HR: 84 (01 Sep 2020 09:49) (68 - 98)  BP: 125/65 (01 Sep 2020 08:48) (105/62 - 125/65)  RR: 18 (01 Sep 2020 08:48) (18 - 18)  SpO2: 95% (01 Sep 2020 09:49) (92% - 98%)    GENERAL:         comfortable,  - distress.  HEENT:            - trauma,  - icterus,  - injection,  - nasal discharge.  NECK:              - jugular venous distention, - thyromegaly.  LYMPH:           - lymphadenopathy, - masses.  RESP:              + clear,   - rales,   - rhonchi,   - wheezes.   COR:                S1S2   - gallops,  - rubs.  ABD:                bowel sounds,   soft, - tender, - distended.  EXT/MSC:         - cyanosis,  - clubbing, + edema.    NEURO:           + alert and oriented                             12.3   4.26  )-----------( 159      ( 01 Sep 2020 06:30 )             39.9     09-01    143  |  96  |  32<H>  ----------------------------<  111<H>  3.7   |  37<H>  |  1.26    Ca    9.6      01 Sep 2020 06:30  Mg     2.0     09-01      TTE Echo Complete w/o Contrast w/ Doppler (08.31.20)   1.  Normal left ventricular size and function.   2. Moderately-to-severely dilated right ventricular size.   3. Normal right ventricular systolic function.   4. Dilated right atrium.   5. Mild-to-moderate tricuspid regurgitation.   6. Pulmonary hypertension present, pulmonary artery systolic pressure is 64 mmHg.   7. No pericardial effusion.    X-ray Chest 1 View- PORTABLE-Urgent (08.31.20)  Stable heart size, thoracic aortic calcification. Pulmonary arterial hypertension. Left basilar opacity/pleural effusion and right basilar opacity, unchanged.. Stable bony structures.    NM Pulmonary Ventilation/Perfusion Scan (07.30.20 @ 17:35) >  No evidence of pulmonary embolism. A large area of decreased ventilation and perfusion involving the lower portion of the left lung, apparently due to consolidation and/or pleural effusion.    CT Angio Chest PE Protocol w/ IV Cont (07.28.20)   No central pulmonary embolism. Cannot assess for lobar, segmental or subsegmental branches due to artifact from respiratory motion and suboptimal contrast bolus timing. Unchanged prominent right main pulmonary artery measuring up to 3.6 cm of unclear clinical significance. Two-vessel configuration of the aortic arch. Mild calcified plaque aorta. Mild aortic root calcification. Cardiomegaly. Moderate coronary artery calcification. No pericardial effusion is seen.  Few prominent mediastinal nodes.   Para-aortic, 1.0 x 1.0 cm. Right upper paratracheal, posterior 1.5 x 1.0 cm.  Small left pleural effusions is seen. Left lower lobe consolidation with air bronchograms and adjacent compressive atelectasis. Trace right lower lobe linear atelectasis.  Limited evaluation of the upper abdomen demonstrates small amount of ascites.  Evaluation of the soft tissues demonstrates bilateral symmetric gynecomastia.  Evaluation of the osseous structures demonstrates degenerative changes in the spine.    IMPRESSION:    1.  No central pulmonary embolism. Cannot assess for lobar, segmental or subsegmental branches due to artifact from respiratory motion and suboptimal contrast bolus timing.  2.  Since November 9, 2016, increased left lower lobe and lingular consolidation and new dependent groundglass airspace opacity with unchanged elevated left hemidiaphragm, possibly atelectasis, cannot exclude infection.  3.  New small left pleural effusion.  4.  Few prominent mediastinal nodes, nonspecific, possibly inflammatory or infectious.        ASSESSMENT/PLAN    1) Status post cardioversoin  2) Cardiomyopathy  3) Asthma  4) Atelectasis  5) Pulmonary hypertension  6) SHARONA, rule out    Nocturnal NIPPV  Evaluate for satisfactory SpO2 on ambulation  Will arrange for formal PFT's, sleep study  Bronchodilators:  Atrovent/ albuterol q 4 – 6 hours as needed  Corticosteroids: budesonide  Cardiac/HTN: diuresis as needed  GI: Rx/ prophylaxis c PPI/H2B  Heme: Rx/VT receiving AC  Discussed with Dr. Cali

## 2020-09-01 NOTE — PROGRESS NOTE ADULT - SUBJECTIVE AND OBJECTIVE BOX
69 y/o male s/p failed ablation a flutter s/p dccv recurrent aflutter normal lv function pulmonary htn pa systolic 64 mm/hg fluid overload improved with diuretics beta blocker sleep apnea seen by pulmonary dr bae rec cpap clinically improved lungs improved air entry heart jennifer s1s2 2/6 holosystolic apical murmur discussed need for ablation with all concerned

## 2020-09-04 ENCOUNTER — NON-APPOINTMENT (OUTPATIENT)
Age: 80
End: 2020-09-04

## 2020-09-10 ENCOUNTER — NON-APPOINTMENT (OUTPATIENT)
Age: 80
End: 2020-09-10

## 2020-09-11 DIAGNOSIS — Z88.1 ALLERGY STATUS TO OTHER ANTIBIOTIC AGENTS STATUS: ICD-10-CM

## 2020-09-11 DIAGNOSIS — I50.33 ACUTE ON CHRONIC DIASTOLIC (CONGESTIVE) HEART FAILURE: ICD-10-CM

## 2020-09-11 DIAGNOSIS — E66.3 OVERWEIGHT: ICD-10-CM

## 2020-09-11 DIAGNOSIS — E78.5 HYPERLIPIDEMIA, UNSPECIFIED: ICD-10-CM

## 2020-09-11 DIAGNOSIS — I11.0 HYPERTENSIVE HEART DISEASE WITH HEART FAILURE: ICD-10-CM

## 2020-09-11 DIAGNOSIS — Z79.51 LONG TERM (CURRENT) USE OF INHALED STEROIDS: ICD-10-CM

## 2020-09-11 DIAGNOSIS — Z79.84 LONG TERM (CURRENT) USE OF ORAL HYPOGLYCEMIC DRUGS: ICD-10-CM

## 2020-09-11 DIAGNOSIS — Z88.0 ALLERGY STATUS TO PENICILLIN: ICD-10-CM

## 2020-09-11 DIAGNOSIS — I48.4 ATYPICAL ATRIAL FLUTTER: ICD-10-CM

## 2020-09-11 DIAGNOSIS — J45.909 UNSPECIFIED ASTHMA, UNCOMPLICATED: ICD-10-CM

## 2020-09-11 DIAGNOSIS — J98.11 ATELECTASIS: ICD-10-CM

## 2020-09-11 DIAGNOSIS — E03.9 HYPOTHYROIDISM, UNSPECIFIED: ICD-10-CM

## 2020-09-11 DIAGNOSIS — E11.9 TYPE 2 DIABETES MELLITUS WITHOUT COMPLICATIONS: ICD-10-CM

## 2020-09-11 DIAGNOSIS — I48.20 CHRONIC ATRIAL FIBRILLATION, UNSPECIFIED: ICD-10-CM

## 2020-09-11 DIAGNOSIS — G47.33 OBSTRUCTIVE SLEEP APNEA (ADULT) (PEDIATRIC): ICD-10-CM

## 2020-09-11 DIAGNOSIS — I27.20 PULMONARY HYPERTENSION, UNSPECIFIED: ICD-10-CM

## 2020-09-11 DIAGNOSIS — I42.9 CARDIOMYOPATHY, UNSPECIFIED: ICD-10-CM

## 2020-09-23 ENCOUNTER — APPOINTMENT (OUTPATIENT)
Dept: HEART AND VASCULAR | Facility: CLINIC | Age: 80
End: 2020-09-23

## 2021-07-14 NOTE — PATIENT PROFILE ADULT - HEALTH LITERACY
----- Message from Gill Rowe PA-C sent at 7/14/2021 12:20 PM CDT -----  Please notify patient of negative covid and strep. (patient resulting results via telephone call).  Likely viral- plan as discussed.    
Message left for patient to return call to urgent care.   
Patient returned urgent care call.  
Writer notified patient of negative strep and covid and information as seen in result note below.   
no

## 2021-12-20 ENCOUNTER — TRANSCRIPTION ENCOUNTER (OUTPATIENT)
Age: 81
End: 2021-12-20

## 2021-12-21 ENCOUNTER — INPATIENT (INPATIENT)
Facility: HOSPITAL | Age: 81
LOS: 2 days | Discharge: HOME | DRG: 354 | End: 2021-12-24
Attending: SURGERY | Admitting: SURGERY
Payer: MEDICARE

## 2021-12-21 VITALS
OXYGEN SATURATION: 94 % | WEIGHT: 220.02 LBS | HEART RATE: 97 BPM | SYSTOLIC BLOOD PRESSURE: 142 MMHG | DIASTOLIC BLOOD PRESSURE: 96 MMHG | HEIGHT: 71 IN | RESPIRATION RATE: 17 BRPM | TEMPERATURE: 98 F

## 2021-12-21 DIAGNOSIS — Z98.890 OTHER SPECIFIED POSTPROCEDURAL STATES: Chronic | ICD-10-CM

## 2021-12-21 DIAGNOSIS — I11.0 HYPERTENSIVE HEART DISEASE WITH HEART FAILURE: ICD-10-CM

## 2021-12-21 DIAGNOSIS — Z79.01 LONG TERM (CURRENT) USE OF ANTICOAGULANTS: ICD-10-CM

## 2021-12-21 DIAGNOSIS — Z88.1 ALLERGY STATUS TO OTHER ANTIBIOTIC AGENTS STATUS: ICD-10-CM

## 2021-12-21 DIAGNOSIS — J45.909 UNSPECIFIED ASTHMA, UNCOMPLICATED: ICD-10-CM

## 2021-12-21 DIAGNOSIS — I48.91 UNSPECIFIED ATRIAL FIBRILLATION: ICD-10-CM

## 2021-12-21 DIAGNOSIS — I27.20 PULMONARY HYPERTENSION, UNSPECIFIED: ICD-10-CM

## 2021-12-21 DIAGNOSIS — K42.0 UMBILICAL HERNIA WITH OBSTRUCTION, WITHOUT GANGRENE: ICD-10-CM

## 2021-12-21 DIAGNOSIS — Z88.0 ALLERGY STATUS TO PENICILLIN: ICD-10-CM

## 2021-12-21 DIAGNOSIS — I50.9 HEART FAILURE, UNSPECIFIED: ICD-10-CM

## 2021-12-21 DIAGNOSIS — E03.9 HYPOTHYROIDISM, UNSPECIFIED: ICD-10-CM

## 2021-12-21 DIAGNOSIS — Z87.891 PERSONAL HISTORY OF NICOTINE DEPENDENCE: ICD-10-CM

## 2021-12-21 DIAGNOSIS — E80.4 GILBERT SYNDROME: ICD-10-CM

## 2021-12-21 DIAGNOSIS — I42.9 CARDIOMYOPATHY, UNSPECIFIED: ICD-10-CM

## 2021-12-21 DIAGNOSIS — E11.9 TYPE 2 DIABETES MELLITUS WITHOUT COMPLICATIONS: ICD-10-CM

## 2021-12-21 DIAGNOSIS — I48.92 UNSPECIFIED ATRIAL FLUTTER: ICD-10-CM

## 2021-12-21 DIAGNOSIS — Z79.84 LONG TERM (CURRENT) USE OF ORAL HYPOGLYCEMIC DRUGS: ICD-10-CM

## 2021-12-21 LAB
ALBUMIN SERPL ELPH-MCNC: 4.6 G/DL — SIGNIFICANT CHANGE UP (ref 3.3–5)
ALP SERPL-CCNC: 107 U/L — SIGNIFICANT CHANGE UP (ref 40–120)
ALT FLD-CCNC: 26 U/L — SIGNIFICANT CHANGE UP (ref 10–45)
ANION GAP SERPL CALC-SCNC: 16 MMOL/L — SIGNIFICANT CHANGE UP (ref 5–17)
APPEARANCE UR: CLEAR — SIGNIFICANT CHANGE UP
APTT BLD: 38.7 SEC — HIGH (ref 27.5–35.5)
AST SERPL-CCNC: 36 U/L — SIGNIFICANT CHANGE UP (ref 10–40)
BACTERIA # UR AUTO: PRESENT /HPF
BASOPHILS # BLD AUTO: 0.06 K/UL — SIGNIFICANT CHANGE UP (ref 0–0.2)
BASOPHILS NFR BLD AUTO: 0.8 % — SIGNIFICANT CHANGE UP (ref 0–2)
BILIRUB SERPL-MCNC: 3.6 MG/DL — HIGH (ref 0.2–1.2)
BILIRUB UR-MCNC: NEGATIVE — SIGNIFICANT CHANGE UP
BUN SERPL-MCNC: 27 MG/DL — HIGH (ref 7–23)
CALCIUM SERPL-MCNC: 10.4 MG/DL — SIGNIFICANT CHANGE UP (ref 8.4–10.5)
CHLORIDE SERPL-SCNC: 76 MMOL/L — LOW (ref 96–108)
CO2 SERPL-SCNC: 44 MMOL/L — HIGH (ref 22–31)
COLOR SPEC: YELLOW — SIGNIFICANT CHANGE UP
COMMENT - URINE: SIGNIFICANT CHANGE UP
CREAT SERPL-MCNC: 1.17 MG/DL — SIGNIFICANT CHANGE UP (ref 0.5–1.3)
DIFF PNL FLD: ABNORMAL
EOSINOPHIL # BLD AUTO: 0.01 K/UL — SIGNIFICANT CHANGE UP (ref 0–0.5)
EOSINOPHIL NFR BLD AUTO: 0.1 % — SIGNIFICANT CHANGE UP (ref 0–6)
EPI CELLS # UR: SIGNIFICANT CHANGE UP /HPF (ref 0–5)
GLUCOSE SERPL-MCNC: 146 MG/DL — HIGH (ref 70–99)
GLUCOSE UR QL: NEGATIVE — SIGNIFICANT CHANGE UP
HCT VFR BLD CALC: 50.4 % — HIGH (ref 39–50)
HGB BLD-MCNC: 16.9 G/DL — SIGNIFICANT CHANGE UP (ref 13–17)
HYALINE CASTS # UR AUTO: ABNORMAL /LPF (ref 0–2)
IMM GRANULOCYTES NFR BLD AUTO: 0.3 % — SIGNIFICANT CHANGE UP (ref 0–1.5)
INR BLD: 2.43 — HIGH (ref 0.88–1.16)
KETONES UR-MCNC: NEGATIVE — SIGNIFICANT CHANGE UP
LACTATE SERPL-SCNC: 1.7 MMOL/L — SIGNIFICANT CHANGE UP (ref 0.5–2)
LACTATE SERPL-SCNC: 2.3 MMOL/L — HIGH (ref 0.5–2)
LEUKOCYTE ESTERASE UR-ACNC: NEGATIVE — SIGNIFICANT CHANGE UP
LYMPHOCYTES # BLD AUTO: 0.7 K/UL — LOW (ref 1–3.3)
LYMPHOCYTES # BLD AUTO: 9 % — LOW (ref 13–44)
MCHC RBC-ENTMCNC: 33.5 GM/DL — SIGNIFICANT CHANGE UP (ref 32–36)
MCHC RBC-ENTMCNC: 34.8 PG — HIGH (ref 27–34)
MCV RBC AUTO: 103.7 FL — HIGH (ref 80–100)
MONOCYTES # BLD AUTO: 0.89 K/UL — SIGNIFICANT CHANGE UP (ref 0–0.9)
MONOCYTES NFR BLD AUTO: 11.4 % — SIGNIFICANT CHANGE UP (ref 2–14)
NEUTROPHILS # BLD AUTO: 6.12 K/UL — SIGNIFICANT CHANGE UP (ref 1.8–7.4)
NEUTROPHILS NFR BLD AUTO: 78.4 % — HIGH (ref 43–77)
NITRITE UR-MCNC: NEGATIVE — SIGNIFICANT CHANGE UP
NRBC # BLD: 0 /100 WBCS — SIGNIFICANT CHANGE UP (ref 0–0)
NT-PROBNP SERPL-SCNC: 1530 PG/ML — HIGH (ref 0–300)
PH UR: 8 — SIGNIFICANT CHANGE UP (ref 5–8)
PLATELET # BLD AUTO: 213 K/UL — SIGNIFICANT CHANGE UP (ref 150–400)
POTASSIUM SERPL-MCNC: 3.1 MMOL/L — LOW (ref 3.5–5.3)
POTASSIUM SERPL-SCNC: 3.1 MMOL/L — LOW (ref 3.5–5.3)
PROT SERPL-MCNC: 8.4 G/DL — HIGH (ref 6–8.3)
PROT UR-MCNC: 100 MG/DL
PROTHROM AB SERPL-ACNC: 27.9 SEC — HIGH (ref 10.6–13.6)
RBC # BLD: 4.86 M/UL — SIGNIFICANT CHANGE UP (ref 4.2–5.8)
RBC # FLD: 14.2 % — SIGNIFICANT CHANGE UP (ref 10.3–14.5)
RBC CASTS # UR COMP ASSIST: > 10 /HPF
SARS-COV-2 RNA SPEC QL NAA+PROBE: NEGATIVE — SIGNIFICANT CHANGE UP
SODIUM SERPL-SCNC: 136 MMOL/L — SIGNIFICANT CHANGE UP (ref 135–145)
SP GR SPEC: 1.01 — SIGNIFICANT CHANGE UP (ref 1–1.03)
TROPONIN T SERPL-MCNC: 0.04 NG/ML — CRITICAL HIGH (ref 0–0.01)
TROPONIN T SERPL-MCNC: 0.05 NG/ML — CRITICAL HIGH (ref 0–0.01)
UROBILINOGEN FLD QL: 1 E.U./DL — SIGNIFICANT CHANGE UP
WBC # BLD: 7.8 K/UL — SIGNIFICANT CHANGE UP (ref 3.8–10.5)
WBC # FLD AUTO: 7.8 K/UL — SIGNIFICANT CHANGE UP (ref 3.8–10.5)
WBC UR QL: < 5 /HPF — SIGNIFICANT CHANGE UP

## 2021-12-21 PROCEDURE — 93010 ELECTROCARDIOGRAM REPORT: CPT

## 2021-12-21 PROCEDURE — 99285 EMERGENCY DEPT VISIT HI MDM: CPT

## 2021-12-21 PROCEDURE — 74177 CT ABD & PELVIS W/CONTRAST: CPT | Mod: 26,MC

## 2021-12-21 PROCEDURE — 74019 RADEX ABDOMEN 2 VIEWS: CPT | Mod: 26

## 2021-12-21 PROCEDURE — 71045 X-RAY EXAM CHEST 1 VIEW: CPT | Mod: 26

## 2021-12-21 RX ORDER — METFORMIN HYDROCHLORIDE 850 MG/1
2 TABLET ORAL
Qty: 0 | Refills: 0 | DISCHARGE

## 2021-12-21 RX ORDER — POTASSIUM CHLORIDE 20 MEQ
40 PACKET (EA) ORAL ONCE
Refills: 0 | Status: COMPLETED | OUTPATIENT
Start: 2021-12-21 | End: 2021-12-21

## 2021-12-21 RX ORDER — METOPROLOL TARTRATE 50 MG
1 TABLET ORAL
Qty: 0 | Refills: 0 | DISCHARGE

## 2021-12-21 RX ORDER — ONDANSETRON 8 MG/1
4 TABLET, FILM COATED ORAL EVERY 6 HOURS
Refills: 0 | Status: DISCONTINUED | OUTPATIENT
Start: 2021-12-21 | End: 2021-12-23

## 2021-12-21 RX ORDER — HEPARIN SODIUM 5000 [USP'U]/ML
5000 INJECTION INTRAVENOUS; SUBCUTANEOUS EVERY 8 HOURS
Refills: 0 | Status: DISCONTINUED | OUTPATIENT
Start: 2021-12-21 | End: 2021-12-23

## 2021-12-21 RX ORDER — SODIUM CHLORIDE 9 MG/ML
1000 INJECTION, SOLUTION INTRAVENOUS
Refills: 0 | Status: DISCONTINUED | OUTPATIENT
Start: 2021-12-21 | End: 2021-12-21

## 2021-12-21 RX ORDER — POTASSIUM CHLORIDE 20 MEQ
40 PACKET (EA) ORAL
Qty: 0 | Refills: 0 | DISCHARGE

## 2021-12-21 RX ORDER — SODIUM CHLORIDE 9 MG/ML
500 INJECTION INTRAMUSCULAR; INTRAVENOUS; SUBCUTANEOUS ONCE
Refills: 0 | Status: COMPLETED | OUTPATIENT
Start: 2021-12-21 | End: 2021-12-21

## 2021-12-21 RX ORDER — METOPROLOL TARTRATE 50 MG
10 TABLET ORAL EVERY 6 HOURS
Refills: 0 | Status: DISCONTINUED | OUTPATIENT
Start: 2021-12-21 | End: 2021-12-22

## 2021-12-21 RX ORDER — ACETAMINOPHEN 500 MG
1000 TABLET ORAL ONCE
Refills: 0 | Status: COMPLETED | OUTPATIENT
Start: 2021-12-21 | End: 2021-12-21

## 2021-12-21 RX ORDER — ONDANSETRON 8 MG/1
4 TABLET, FILM COATED ORAL ONCE
Refills: 0 | Status: COMPLETED | OUTPATIENT
Start: 2021-12-21 | End: 2021-12-21

## 2021-12-21 RX ORDER — FLUTICASONE PROPIONATE AND SALMETEROL 50; 250 UG/1; UG/1
1 POWDER ORAL; RESPIRATORY (INHALATION)
Qty: 0 | Refills: 0 | DISCHARGE

## 2021-12-21 RX ORDER — SODIUM CHLORIDE 9 MG/ML
1000 INJECTION INTRAMUSCULAR; INTRAVENOUS; SUBCUTANEOUS
Refills: 0 | Status: DISCONTINUED | OUTPATIENT
Start: 2021-12-21 | End: 2021-12-23

## 2021-12-21 RX ORDER — FUROSEMIDE 40 MG
1 TABLET ORAL
Qty: 0 | Refills: 0 | DISCHARGE

## 2021-12-21 RX ORDER — IOHEXOL 300 MG/ML
30 INJECTION, SOLUTION INTRAVENOUS ONCE
Refills: 0 | Status: COMPLETED | OUTPATIENT
Start: 2021-12-21 | End: 2021-12-21

## 2021-12-21 RX ORDER — LEVALBUTEROL 1.25 MG/.5ML
3 SOLUTION, CONCENTRATE RESPIRATORY (INHALATION)
Qty: 0 | Refills: 0 | DISCHARGE

## 2021-12-21 RX ADMIN — Medication 1000 MILLIGRAM(S): at 17:48

## 2021-12-21 RX ADMIN — ONDANSETRON 4 MILLIGRAM(S): 8 TABLET, FILM COATED ORAL at 13:53

## 2021-12-21 RX ADMIN — Medication 1000 MILLIGRAM(S): at 17:47

## 2021-12-21 RX ADMIN — SODIUM CHLORIDE 500 MILLILITER(S): 9 INJECTION INTRAMUSCULAR; INTRAVENOUS; SUBCUTANEOUS at 17:48

## 2021-12-21 RX ADMIN — HEPARIN SODIUM 5000 UNIT(S): 5000 INJECTION INTRAVENOUS; SUBCUTANEOUS at 23:20

## 2021-12-21 RX ADMIN — SODIUM CHLORIDE 500 MILLILITER(S): 9 INJECTION INTRAMUSCULAR; INTRAVENOUS; SUBCUTANEOUS at 13:53

## 2021-12-21 RX ADMIN — IOHEXOL 30 MILLILITER(S): 300 INJECTION, SOLUTION INTRAVENOUS at 13:52

## 2021-12-21 RX ADMIN — SODIUM CHLORIDE 500 MILLILITER(S): 9 INJECTION INTRAMUSCULAR; INTRAVENOUS; SUBCUTANEOUS at 15:43

## 2021-12-21 RX ADMIN — Medication 1000 MILLIGRAM(S): at 23:50

## 2021-12-21 RX ADMIN — Medication 400 MILLIGRAM(S): at 16:37

## 2021-12-21 RX ADMIN — SODIUM CHLORIDE 85 MILLILITER(S): 9 INJECTION INTRAMUSCULAR; INTRAVENOUS; SUBCUTANEOUS at 23:20

## 2021-12-21 RX ADMIN — Medication 400 MILLIGRAM(S): at 23:28

## 2021-12-21 RX ADMIN — Medication 40 MILLIEQUIVALENT(S): at 15:43

## 2021-12-21 RX ADMIN — SODIUM CHLORIDE 40 MILLILITER(S): 9 INJECTION, SOLUTION INTRAVENOUS at 21:56

## 2021-12-21 NOTE — PATIENT PROFILE ADULT - FALL HARM RISK - HARM RISK INTERVENTIONS

## 2021-12-21 NOTE — ED ADULT NURSE NOTE - CHIEF COMPLAINT
Detail Level: Generalized The patient is a 81y Male complaining of abdominal pain. General Sunscreen Counseling: I recommended a broad spectrum sunscreen with a SPF of 30 or higher.  I explained that SPF 30 sunscreens block approximately 97 percent of the sun's harmful rays.  Sunscreens should be applied at least 15 minutes prior to expected sun exposure and then every 2 hours after that as long as sun exposure continues. If swimming or exercising sunscreen should be reapplied every 45 minutes to an hour after getting wet or sweating.  One ounce, or the equivalent of a shot glass full of sunscreen, is adequate to protect the skin not covered by a bathing suit. I also recommended a lip balm with a sunscreen as well. Sun protective clothing can be used in lieu of sunscreen but must be worn the entire time you are exposed to the sun's rays. Products Recommended: Colorescience Brush

## 2021-12-21 NOTE — H&P ADULT - ASSESSMENT
81M with pmh of HTN, HLD, Afib/aflutter (on Xarelto, last taken 12/20 PM), hypothyroidism, T2DM, ?SBO vs constipation (managed with NG decompression) and no previous abdominal surgeries presents from home with four day history of PO intolerance, and obstipation. Clinical and radiographic findings c/w with SBO likely 2/2 to incarcerated umbilical hernia now reduced.     Admit to general surgery team 5 under Dr. Rust  NPO/IVF  Strict I&Os  Repeat lactate 8PM  Cards c/s (Viraj)- recommend holding anticoagulation, c/w BB, holding remaining diuretics  F/u 2 view AXR  F/u CXR  F/u TTE  F/u AM labs  Analgesics PRN  Antiemetics PRN  mISS  OWINNIE  VTE PPX with SCDs and SQH  Patient discussed with surgery attending 81M with pmh of HTN, HLD, Afib/aflutter (on Xarelto, last taken 12/20 PM), hypothyroidism, T2DM, ?SBO vs constipation (managed with NG decompression) and no previous abdominal surgeries presents from home with four day history of PO intolerance, and obstipation. Clinical and radiographic findings c/w with SBO likely 2/2 to incarcerated umbilical hernia now easily reduced at bedside.    Admit to general surgery team 5 under Dr. Rust  NPO/IVF  Strict I&Os  Repeat lactate 8PM  Cards c/s (Viraj)- recommend holding anticoagulation, c/w BB, holding remaining diuretics  F/u 2 view AXR  F/u CXR  F/u TTE  F/u AM labs  Analgesics PRN  Antiemetics PRN  mISS STATON  VTE PPX with SCDs and SQH  Patient discussed with surgery attending

## 2021-12-21 NOTE — ED ADULT TRIAGE NOTE - CHIEF COMPLAINT QUOTE
pt c/o abdominal pain, vomiting, decreased appetite x 3 days. pt also c/o umbilical hernia that was able to  reduce  and now is out "

## 2021-12-21 NOTE — H&P ADULT - HISTORY OF PRESENT ILLNESS
HPI:  81M with pmh of HTN, HLD, Afib/aflutter (on Xarelto, last taken 12/20 PM), hypothyroidism, T2DM, ?SBO vs constipation (managed with NG decompression) and no previous abdominal surgeries presents from home with four day history of PO intolerance, and obstipation. Reports eating some meat on Friday which was followed by forceful vomiting, nonbloody, bilious. Reports being unable to tolerate anything PO since. Denies any nausea or abdominal pain but does report that umbilical hernia "popped out" with the forceful vomiting and has been unable to reduce it. Last bowel movement was on friday, nonmelanotic. Has not passed flatus in 2-3 weeks. Has had the umbilical hernia for years and has always been able to reduce it. Has never had a colonoscopy. Denies f/c, nausea, CP, SOB, cough, abdominal pain, melena, hematochezia, dysuria, hematuria, weakness or pain in extremities.     In the ED initial vitals were as follows; Temp 97.7, HR 97 (rate controlled afib), /78, RR 16, SpO2 99%. Initial labs notable for Hct 50, K 3.1, TBli 3.6, lactate 2.3, Trop 0.05, BNP 1530, INR 2.43. Was given 1L NS. CT A/P notable for distal small bowel obstruction possibly on the basis of adhesion. There is a fat and fluid containing umbilical hernia but no small bowel enters or exits this hernia. SMA patent. No bowel pneumatosis. No ascites. There is mesenteric edema. Delayed CT can be obtained to assess for the transit of the dilute contrast material through the small bowel into the large bowel.    PMH: of HTN, HLD, Afib (on Xarelto, last taken 12/20 PM), hypothyroidism, T2DM, ?SBO vs constipation (managed with NG decompression)   PSH: Non-pertinent  Allergies: Penicillin  Medications: Xarelto 20mg, Metoprolol 50mg BID, Levothyroxine 50mcg, Metformin 750mg BID, Atorvastatin 40, Lasix 80mg, KClor 20 meq BID, Metoloxon 5mg/week  SH: No h/o tobacco use. No h/o EtOH use  FH: No h/o CRC, IBD, or IBS

## 2021-12-21 NOTE — ED ADULT NURSE NOTE - OBJECTIVE STATEMENT
Patient p/w x 3 days abdominal pain, nausea, and nbnb emesis. As per patient, hernia dislodged 3 days ago and since then has been having s/s listed. Last BM 3 days ago Patient p/w x 3 days abdominal pain, nausea, and NBNB emesis. As per patient, hernia dislodged 3 days ago and since then has been having s/s listed. As per patient hernia is typically easily reduce, but this time has been unable to. Last BM 3 days ago.  Patient denies any associated fever, chills, dysuria, diarrhea, HA, dizziness, sob or cp. Abdomen distended, non-tender. As per patient, he is unable to tolerate PO. Patient was a clinical upgrade d/t EKG changes

## 2021-12-21 NOTE — ED PROVIDER NOTE - OBJECTIVE STATEMENT
80 y/o M w/ Hx of HTN, HLD, AFib s/p ablation 2016, hypothyroidism, heart failure, pulmonary hypertension, presents today w/ N/V for several days and inability to tolerate PO. Last BM 3 days ago. Reports umbilical hernia for a year. Endorses umbilical pain. Reports seeing Dr. Cali who is his PCP and was told to go to ED for evaluation. No reported Hx of abd surgeries. No fever, chills, or urinary symptoms.

## 2021-12-21 NOTE — H&P ADULT - NSHPADDITIONALINFOADULT_GEN_ALL_CORE
ATT NOTE:::       sa above  pt seen examnd by me personally in [facility] ER 12- w/ res at 8PM  Adm   NPOO    Cardiol eval   DC anticoag    needs OR repair Prev Incarc hernia  Fully discd w/ pt  wife  team  Dr Cali

## 2021-12-21 NOTE — H&P ADULT - NSHPLABSRESULTS_GEN_ALL_CORE
LABS:                        16.9   7.80  )-----------( 213      ( 21 Dec 2021 13:04 )             50.4         136  |  76<L>  |  27<H>  ----------------------------<  146<H>  3.1<L>   |  44<H>  |  1.17    Ca    10.4      21 Dec 2021 13:04    TPro  8.4<H>  /  Alb  4.6  /  TBili  3.6<H>  /  DBili  0.5<H>  /  AST  36  /  ALT  26  /  AlkPhos  107  12-21    PT/INR - ( 21 Dec 2021 13:04 )   PT: 27.9 sec;   INR: 2.43          PTT - ( 21 Dec 2021 13:04 )  PTT:38.7 sec  Urinalysis Basic - ( 21 Dec 2021 14:21 )    Color: Yellow / Appearance: Clear / S.015 / pH: x  Gluc: x / Ketone: NEGATIVE  / Bili: Negative / Urobili: 1.0 E.U./dL   Blood: x / Protein: 100 mg/dL / Nitrite: NEGATIVE   Leuk Esterase: NEGATIVE / RBC: > 10 /HPF / WBC < 5 /HPF   Sq Epi: x / Non Sq Epi: 0-5 /HPF / Bacteria: Present /HPF        RADIOLOGY & ADDITIONAL STUDIES:  < from: CT Abdomen and Pelvis w/ Oral Cont and w/ IV Cont (21 @ 15:13) >    IMPRESSION: There appears to be distal small bowel obstruction possibly   on the basis of adhesion. There is a fat and fluid containing umbilical   hernia but no small bowel enters or exits this hernia. SMA patent. No   bowel pneumatosis. No ascites. There is mesenteric edema. Delayed CT can   be obtained to assess for the transit of the dilute contrast material   through the small bowel into the large bowel.      --- End of Report ---      RODRIGO DRAKE MD; Attending Radiologist  This document has been electronically signed. Dec 21 2021  4:13PM      < end of copied text >

## 2021-12-21 NOTE — H&P ADULT - NSHPPHYSICALEXAM_GEN_ALL_CORE
Vital Signs Last 24 Hrs  T(C): 36.9 (21 Dec 2021 17:27), Max: 36.9 (21 Dec 2021 17:27)  T(F): 98.4 (21 Dec 2021 17:27), Max: 98.4 (21 Dec 2021 17:27)  HR: 87 (21 Dec 2021 17:27) (84 - 97)  BP: 146/89 (21 Dec 2021 17:27) (140/90 - 146/89)  BP(mean): --  RR: 18 (21 Dec 2021 17:27) (17 - 18)  SpO2: 97% (21 Dec 2021 17:27) (94% - 97%)  I&O's Detail    PHYSICAL EXAM:  General: NAD, resting comfortably in bed  C/V: afib/aflutter  Pulm: Nonlabored breathing, no respiratory distress  Abd: soft, moderate distended, tympanic in LUQ. nTTP. No rebound or guarding. Large umbilical hernia with mild overlying erythema at umbilicus. Bowel palpated in umbilical hernia with 3cm fascial defect.   Extrem: WWP, no edema  Skin: warm, no rashes  Psych: calm, appropriate

## 2021-12-21 NOTE — ED PROVIDER NOTE - CLINICAL SUMMARY MEDICAL DECISION MAKING FREE TEXT BOX
82 y/o M w/ N/V for several days and inability to tolerate PO. Plan for abdominal workup including CT abd/pelvis w/ PO and IV contrast, Zofran, and 500cc fluids. 82 y/o M w/ N/V for several days and inability to tolerate PO. Plan for abdominal workup including CT abd/pelvis w/ PO and IV contrast, Zofran, and 500cc fluids.    CT with sbo - seen by surgery and agrees on admission.  Hernia reduced by surgery prior to him drinking contrast.

## 2021-12-21 NOTE — ED PROVIDER NOTE - GASTROINTESTINAL, MLM
Periumbilical hernia, tender. Reducible. Distended. Surgery saw pt and reduced hernia and pt reports feeling better after.

## 2021-12-22 LAB
A1C WITH ESTIMATED AVERAGE GLUCOSE RESULT: 5.3 % — SIGNIFICANT CHANGE UP (ref 4–5.6)
ALBUMIN SERPL ELPH-MCNC: 3.8 G/DL — SIGNIFICANT CHANGE UP (ref 3.3–5)
ALBUMIN SERPL ELPH-MCNC: 4 G/DL — SIGNIFICANT CHANGE UP (ref 3.3–5)
ALBUMIN SERPL ELPH-MCNC: 4 G/DL — SIGNIFICANT CHANGE UP (ref 3.3–5)
ALBUMIN SERPL ELPH-MCNC: 4.2 G/DL — SIGNIFICANT CHANGE UP (ref 3.3–5)
ALP SERPL-CCNC: 82 U/L — SIGNIFICANT CHANGE UP (ref 40–120)
ALP SERPL-CCNC: 83 U/L — SIGNIFICANT CHANGE UP (ref 40–120)
ALP SERPL-CCNC: 88 U/L — SIGNIFICANT CHANGE UP (ref 40–120)
ALP SERPL-CCNC: 92 U/L — SIGNIFICANT CHANGE UP (ref 40–120)
ALT FLD-CCNC: 21 U/L — SIGNIFICANT CHANGE UP (ref 10–45)
ALT FLD-CCNC: 21 U/L — SIGNIFICANT CHANGE UP (ref 10–45)
ALT FLD-CCNC: 22 U/L — SIGNIFICANT CHANGE UP (ref 10–45)
ALT FLD-CCNC: 22 U/L — SIGNIFICANT CHANGE UP (ref 10–45)
ANION GAP SERPL CALC-SCNC: 11 MMOL/L — SIGNIFICANT CHANGE UP (ref 5–17)
ANION GAP SERPL CALC-SCNC: 12 MMOL/L — SIGNIFICANT CHANGE UP (ref 5–17)
ANION GAP SERPL CALC-SCNC: 12 MMOL/L — SIGNIFICANT CHANGE UP (ref 5–17)
APTT BLD: 29.6 SEC — SIGNIFICANT CHANGE UP (ref 27.5–35.5)
APTT BLD: 30.2 SEC — SIGNIFICANT CHANGE UP (ref 27.5–35.5)
AST SERPL-CCNC: 25 U/L — SIGNIFICANT CHANGE UP (ref 10–40)
AST SERPL-CCNC: 29 U/L — SIGNIFICANT CHANGE UP (ref 10–40)
AST SERPL-CCNC: 29 U/L — SIGNIFICANT CHANGE UP (ref 10–40)
AST SERPL-CCNC: 33 U/L — SIGNIFICANT CHANGE UP (ref 10–40)
BILIRUB DIRECT SERPL-MCNC: 0.4 MG/DL — HIGH (ref 0–0.3)
BILIRUB DIRECT SERPL-MCNC: 0.6 MG/DL — HIGH (ref 0–0.3)
BILIRUB INDIRECT FLD-MCNC: 1.8 MG/DL — HIGH (ref 0.2–1)
BILIRUB INDIRECT FLD-MCNC: 2.7 MG/DL — HIGH (ref 0.2–1)
BILIRUB SERPL-MCNC: 2.2 MG/DL — HIGH (ref 0.2–1.2)
BILIRUB SERPL-MCNC: 3 MG/DL — HIGH (ref 0.2–1.2)
BILIRUB SERPL-MCNC: 3.3 MG/DL — HIGH (ref 0.2–1.2)
BILIRUB SERPL-MCNC: 3.3 MG/DL — HIGH (ref 0.2–1.2)
BLD GP AB SCN SERPL QL: NEGATIVE — SIGNIFICANT CHANGE UP
BUN SERPL-MCNC: 27 MG/DL — HIGH (ref 7–23)
BUN SERPL-MCNC: 32 MG/DL — HIGH (ref 7–23)
BUN SERPL-MCNC: 34 MG/DL — HIGH (ref 7–23)
CALCIUM SERPL-MCNC: 8.6 MG/DL — SIGNIFICANT CHANGE UP (ref 8.4–10.5)
CALCIUM SERPL-MCNC: 8.7 MG/DL — SIGNIFICANT CHANGE UP (ref 8.4–10.5)
CALCIUM SERPL-MCNC: 8.8 MG/DL — SIGNIFICANT CHANGE UP (ref 8.4–10.5)
CHLORIDE SERPL-SCNC: 86 MMOL/L — LOW (ref 96–108)
CHLORIDE SERPL-SCNC: 86 MMOL/L — LOW (ref 96–108)
CHLORIDE SERPL-SCNC: 88 MMOL/L — LOW (ref 96–108)
CO2 SERPL-SCNC: 32 MMOL/L — HIGH (ref 22–31)
CO2 SERPL-SCNC: 33 MMOL/L — HIGH (ref 22–31)
CO2 SERPL-SCNC: 35 MMOL/L — HIGH (ref 22–31)
CREAT SERPL-MCNC: 1.17 MG/DL — SIGNIFICANT CHANGE UP (ref 0.5–1.3)
CREAT SERPL-MCNC: 1.32 MG/DL — HIGH (ref 0.5–1.3)
CREAT SERPL-MCNC: 1.41 MG/DL — HIGH (ref 0.5–1.3)
CULTURE RESULTS: SIGNIFICANT CHANGE UP
ESTIMATED AVERAGE GLUCOSE: 105 MG/DL — SIGNIFICANT CHANGE UP (ref 68–114)
GLUCOSE BLDC GLUCOMTR-MCNC: 114 MG/DL — HIGH (ref 70–99)
GLUCOSE BLDC GLUCOMTR-MCNC: 119 MG/DL — HIGH (ref 70–99)
GLUCOSE BLDC GLUCOMTR-MCNC: 125 MG/DL — HIGH (ref 70–99)
GLUCOSE SERPL-MCNC: 112 MG/DL — HIGH (ref 70–99)
GLUCOSE SERPL-MCNC: 115 MG/DL — HIGH (ref 70–99)
GLUCOSE SERPL-MCNC: 126 MG/DL — HIGH (ref 70–99)
HCT VFR BLD CALC: 38.1 % — LOW (ref 39–50)
HGB BLD-MCNC: 12.9 G/DL — LOW (ref 13–17)
INR BLD: 1.15 — SIGNIFICANT CHANGE UP (ref 0.88–1.16)
INR BLD: 1.28 — HIGH (ref 0.88–1.16)
MAGNESIUM SERPL-MCNC: 2.7 MG/DL — HIGH (ref 1.6–2.6)
MCHC RBC-ENTMCNC: 33.9 GM/DL — SIGNIFICANT CHANGE UP (ref 32–36)
MCHC RBC-ENTMCNC: 36.3 PG — HIGH (ref 27–34)
MCV RBC AUTO: 107.3 FL — HIGH (ref 80–100)
NRBC # BLD: 0 /100 WBCS — SIGNIFICANT CHANGE UP (ref 0–0)
PHOSPHATE SERPL-MCNC: 2.6 MG/DL — SIGNIFICANT CHANGE UP (ref 2.5–4.5)
PLATELET # BLD AUTO: 167 K/UL — SIGNIFICANT CHANGE UP (ref 150–400)
POTASSIUM SERPL-MCNC: 3 MMOL/L — LOW (ref 3.5–5.3)
POTASSIUM SERPL-MCNC: 3.4 MMOL/L — LOW (ref 3.5–5.3)
POTASSIUM SERPL-MCNC: 3.6 MMOL/L — SIGNIFICANT CHANGE UP (ref 3.5–5.3)
POTASSIUM SERPL-SCNC: 3 MMOL/L — LOW (ref 3.5–5.3)
POTASSIUM SERPL-SCNC: 3.4 MMOL/L — LOW (ref 3.5–5.3)
POTASSIUM SERPL-SCNC: 3.6 MMOL/L — SIGNIFICANT CHANGE UP (ref 3.5–5.3)
PROT SERPL-MCNC: 6.8 G/DL — SIGNIFICANT CHANGE UP (ref 6–8.3)
PROT SERPL-MCNC: 6.9 G/DL — SIGNIFICANT CHANGE UP (ref 6–8.3)
PROT SERPL-MCNC: 7 G/DL — SIGNIFICANT CHANGE UP (ref 6–8.3)
PROT SERPL-MCNC: 7.1 G/DL — SIGNIFICANT CHANGE UP (ref 6–8.3)
PROTHROM AB SERPL-ACNC: 13.7 SEC — HIGH (ref 10.6–13.6)
PROTHROM AB SERPL-ACNC: 15.2 SEC — HIGH (ref 10.6–13.6)
RBC # BLD: 3.55 M/UL — LOW (ref 4.2–5.8)
RBC # FLD: 14.3 % — SIGNIFICANT CHANGE UP (ref 10.3–14.5)
RH IG SCN BLD-IMP: POSITIVE — SIGNIFICANT CHANGE UP
SODIUM SERPL-SCNC: 131 MMOL/L — LOW (ref 135–145)
SODIUM SERPL-SCNC: 131 MMOL/L — LOW (ref 135–145)
SODIUM SERPL-SCNC: 133 MMOL/L — LOW (ref 135–145)
SPECIMEN SOURCE: SIGNIFICANT CHANGE UP
WBC # BLD: 5.57 K/UL — SIGNIFICANT CHANGE UP (ref 3.8–10.5)
WBC # FLD AUTO: 5.57 K/UL — SIGNIFICANT CHANGE UP (ref 3.8–10.5)

## 2021-12-22 PROCEDURE — 93306 TTE W/DOPPLER COMPLETE: CPT | Mod: 26

## 2021-12-22 PROCEDURE — 74019 RADEX ABDOMEN 2 VIEWS: CPT | Mod: 26,76

## 2021-12-22 RX ORDER — METOPROLOL TARTRATE 50 MG
5 TABLET ORAL EVERY 6 HOURS
Refills: 0 | Status: DISCONTINUED | OUTPATIENT
Start: 2021-12-22 | End: 2021-12-23

## 2021-12-22 RX ORDER — ACETAMINOPHEN 500 MG
650 TABLET ORAL ONCE
Refills: 0 | Status: COMPLETED | OUTPATIENT
Start: 2021-12-22 | End: 2021-12-22

## 2021-12-22 RX ORDER — DEXTROSE 50 % IN WATER 50 %
25 SYRINGE (ML) INTRAVENOUS ONCE
Refills: 0 | Status: DISCONTINUED | OUTPATIENT
Start: 2021-12-22 | End: 2021-12-23

## 2021-12-22 RX ORDER — SODIUM,POTASSIUM PHOSPHATES 278-250MG
1 POWDER IN PACKET (EA) ORAL ONCE
Refills: 0 | Status: COMPLETED | OUTPATIENT
Start: 2021-12-22 | End: 2021-12-22

## 2021-12-22 RX ORDER — SODIUM CHLORIDE 9 MG/ML
1000 INJECTION, SOLUTION INTRAVENOUS
Refills: 0 | Status: DISCONTINUED | OUTPATIENT
Start: 2021-12-22 | End: 2021-12-23

## 2021-12-22 RX ORDER — ATORVASTATIN CALCIUM 80 MG/1
40 TABLET, FILM COATED ORAL AT BEDTIME
Refills: 0 | Status: DISCONTINUED | OUTPATIENT
Start: 2021-12-22 | End: 2021-12-23

## 2021-12-22 RX ORDER — GLUCAGON INJECTION, SOLUTION 0.5 MG/.1ML
1 INJECTION, SOLUTION SUBCUTANEOUS ONCE
Refills: 0 | Status: DISCONTINUED | OUTPATIENT
Start: 2021-12-22 | End: 2021-12-23

## 2021-12-22 RX ORDER — POTASSIUM CHLORIDE 20 MEQ
30 PACKET (EA) ORAL ONCE
Refills: 0 | Status: COMPLETED | OUTPATIENT
Start: 2021-12-22 | End: 2021-12-22

## 2021-12-22 RX ORDER — INSULIN LISPRO 100/ML
VIAL (ML) SUBCUTANEOUS
Refills: 0 | Status: DISCONTINUED | OUTPATIENT
Start: 2021-12-22 | End: 2021-12-23

## 2021-12-22 RX ORDER — DEXTROSE 50 % IN WATER 50 %
15 SYRINGE (ML) INTRAVENOUS ONCE
Refills: 0 | Status: DISCONTINUED | OUTPATIENT
Start: 2021-12-22 | End: 2021-12-23

## 2021-12-22 RX ORDER — SODIUM CHLORIDE 9 MG/ML
1000 INJECTION, SOLUTION INTRAVENOUS
Refills: 0 | Status: DISCONTINUED | OUTPATIENT
Start: 2021-12-22 | End: 2021-12-22

## 2021-12-22 RX ORDER — DIPHENHYDRAMINE HCL 50 MG
25 CAPSULE ORAL AT BEDTIME
Refills: 0 | Status: COMPLETED | OUTPATIENT
Start: 2021-12-22 | End: 2021-12-22

## 2021-12-22 RX ORDER — LEVOTHYROXINE SODIUM 125 MCG
50 TABLET ORAL DAILY
Refills: 0 | Status: DISCONTINUED | OUTPATIENT
Start: 2021-12-22 | End: 2021-12-23

## 2021-12-22 RX ORDER — DEXTROSE 50 % IN WATER 50 %
12.5 SYRINGE (ML) INTRAVENOUS ONCE
Refills: 0 | Status: DISCONTINUED | OUTPATIENT
Start: 2021-12-22 | End: 2021-12-23

## 2021-12-22 RX ORDER — POTASSIUM CHLORIDE 20 MEQ
40 PACKET (EA) ORAL
Refills: 0 | Status: COMPLETED | OUTPATIENT
Start: 2021-12-22 | End: 2021-12-22

## 2021-12-22 RX ADMIN — Medication 650 MILLIGRAM(S): at 11:30

## 2021-12-22 RX ADMIN — Medication 650 MILLIGRAM(S): at 10:30

## 2021-12-22 RX ADMIN — Medication 5 MILLIGRAM(S): at 17:58

## 2021-12-22 RX ADMIN — Medication 650 MILLIGRAM(S): at 22:28

## 2021-12-22 RX ADMIN — Medication 5 MILLIGRAM(S): at 22:16

## 2021-12-22 RX ADMIN — Medication 5 MILLIGRAM(S): at 10:31

## 2021-12-22 RX ADMIN — Medication 40 MILLIEQUIVALENT(S): at 10:31

## 2021-12-22 RX ADMIN — HEPARIN SODIUM 5000 UNIT(S): 5000 INJECTION INTRAVENOUS; SUBCUTANEOUS at 13:51

## 2021-12-22 RX ADMIN — Medication 30 MILLIEQUIVALENT(S): at 17:58

## 2021-12-22 RX ADMIN — Medication 650 MILLIGRAM(S): at 22:16

## 2021-12-22 RX ADMIN — HEPARIN SODIUM 5000 UNIT(S): 5000 INJECTION INTRAVENOUS; SUBCUTANEOUS at 06:24

## 2021-12-22 RX ADMIN — Medication 1 PACKET(S): at 16:17

## 2021-12-22 RX ADMIN — HEPARIN SODIUM 5000 UNIT(S): 5000 INJECTION INTRAVENOUS; SUBCUTANEOUS at 21:26

## 2021-12-22 RX ADMIN — Medication 50 MICROGRAM(S): at 06:24

## 2021-12-22 RX ADMIN — Medication 40 MILLIEQUIVALENT(S): at 13:51

## 2021-12-22 RX ADMIN — ATORVASTATIN CALCIUM 40 MILLIGRAM(S): 80 TABLET, FILM COATED ORAL at 21:25

## 2021-12-22 NOTE — PROVIDER CONTACT NOTE (CRITICAL VALUE NOTIFICATION) - ASSESSMENT
denies lightheadedness, dizziness or bleeding
VSS, pt asymptomatic, denies chest pain or palpatation, cardiologist at [facility] during cardiac event,  No complaints of pain at this time,

## 2021-12-22 NOTE — CONSULT NOTE ADULT - SUBJECTIVE AND OBJECTIVE BOX
HPI:  81M with pmh of HTN, HLD, Afib/aflutter (on Xarelto, last taken 12/20 PM), hypothyroidism, T2DM, ?SBO vs constipation in past  (managed with NG decompression) and no previous abdominal surgeries presents from home with four day history of PO intolerance, and obstipation. Found to have umbilical hernia and reduced.  For OR.  Called because of elevated bili.  Patient had prior admits for Cardio with baseline Bili 1.7 normal transaminases. CT ? mild nodularity of liver.  Denies alcohol. No family history of liver issues.  Per patient when fasting his urine gets darker.    PMH: of HTN, HLD, Afib (on Xarelto, last taken 12/20 PM), hypothyroidism, T2DM, ?SBO vs constipation (managed with NG decompression)   PSH: Non-pertinent  Allergies: Penicillin  Medications: Xarelto 20mg, Metoprolol 50mg BID, Levothyroxine 50mcg, Metformin 750mg BID, Atorvastatin 40, Lasix 80mg, KClor 20 meq BID, Metoloxon 5mg/week  SH: No h/o tobacco use. No h/o EtOH use  FH: No h/o CRC, IBD, or IBS   (21 Dec 2021 17:39)      REVIEW OF SYSTEMS:  Constitutional: No fever, weight loss or fatigue  ENMT:  No difficulty hearing, tinnitus, vertigo; No sinus or throat pain  Respiratory: No cough, wheezing, chills or hemoptysis  Cardiovascular: No chest pain, palpitations, dizziness or leg swelling  Gastrointestinal: No abdominal or epigastric pain. No nausea, vomiting or hematemesis; No diarrhea or constipation. No melena or hematochezia.  Skin: No itching, burning, rashes or lesions   Musculoskeletal: No joint pain or swelling; No muscle, back or extremity pain    PAST MEDICAL & SURGICAL HISTORY:  Atrial fibrillation and flutter    Essential hypertension    DM (diabetes mellitus screen)    History of hypothyroidism    Uncomplicated asthma, unspecified asthma severity    History of cardioversion  2014, 2016    S/P AV kishan ablation        FAMILY HISTORY:  No pertinent family history in first degree relatives        SOCIAL HISTORY:  Smoking Status: [ ] Current, [ ] Former, [ ] Never  Pack Years:    MEDICATIONS:  MEDICATIONS  (STANDING):  atorvastatin 40 milliGRAM(s) Oral at bedtime  dextrose 40% Gel 15 Gram(s) Oral once  dextrose 5%. 1000 milliLiter(s) (50 mL/Hr) IV Continuous <Continuous>  dextrose 5%. 1000 milliLiter(s) (100 mL/Hr) IV Continuous <Continuous>  dextrose 50% Injectable 25 Gram(s) IV Push once  dextrose 50% Injectable 12.5 Gram(s) IV Push once  dextrose 50% Injectable 25 Gram(s) IV Push once  glucagon  Injectable 1 milliGRAM(s) IntraMuscular once  heparin   Injectable 5000 Unit(s) SubCutaneous every 8 hours  insulin lispro (ADMELOG) corrective regimen sliding scale   SubCutaneous three times a day before meals  levothyroxine 50 MICROGram(s) Oral daily  metoprolol tartrate Injectable 5 milliGRAM(s) IV Push every 6 hours  potassium chloride   Solution 30 milliEquivalent(s) Oral once  potassium phosphate / sodium phosphate Powder (PHOS-NaK) 1 Packet(s) Oral once  sodium chloride 0.9%. 1000 milliLiter(s) (85 mL/Hr) IV Continuous <Continuous>    MEDICATIONS  (PRN):  ondansetron Injectable 4 milliGRAM(s) IV Push every 6 hours PRN Nausea and/or Vomiting      Allergies    penicillin (Rash)  tetracycline (Rash)    Intolerances        Vital Signs Last 24 Hrs  T(C): 36.7 (22 Dec 2021 05:55), Max: 36.9 (21 Dec 2021 17:27)  T(F): 98.1 (22 Dec 2021 05:55), Max: 98.5 (21 Dec 2021 19:00)  HR: 72 (22 Dec 2021 10:36) (63 - 87)  BP: 127/80 (22 Dec 2021 10:36) (119/58 - 158/81)  BP(mean): --  RR: 17 (22 Dec 2021 10:36) (17 - 20)  SpO2: 96% (22 Dec 2021 10:36) (93% - 97%)    12-21 @ 07:01  -  12-22 @ 07:00  --------------------------------------------------------  IN: 840 mL / OUT: 200 mL / NET: 640 mL          PHYSICAL EXAM:    General: Well developed; well nourished; in no acute distress  HEENT: MMM, conjunctiva and sclera clear  Gastrointestinal: Soft, non-tender non-distended; Normal bowel sounds; No rebound or guarding. large umbilical hernia  Extremities: Normal range of motion, No clubbing, cyanosis or edema  Neurological: Alert and oriented x3  Skin: Warm and dry. No obvious rash      LABS:                        12.9   5.57  )-----------( 167      ( 22 Dec 2021 12:45 )             38.1     12-22    131<L>  |  88<L>  |  27<H>  ----------------------------<  115<H>  3.4<L>   |  32<H>  |  1.17    Ca    8.6      22 Dec 2021 12:45  Phos  2.6     12-22  Mg     2.7     12-22    TPro  6.8  /  Alb  4.0  /  TBili  2.2<H>  /  DBili  0.4<H>  /  AST  33  /  ALT  21  /  AlkPhos  82  12-22      Culture Results:   Less than 10,000 cols/cc; Insignificant amount of growth. (12-21 @ 16:32)      RADIOLOGY & ADDITIONAL STUDIES:

## 2021-12-22 NOTE — PROVIDER CONTACT NOTE (CRITICAL VALUE NOTIFICATION) - ACTION/TREATMENT ORDERED:
Samuel Caballero MD made aware. cardiologist at bedside during event. no interventions ordered at this time, will continue to monitor
Ania GARCIA made aware while he is on the unit, no new intervention ordered.

## 2021-12-22 NOTE — PROGRESS NOTE ADULT - NSPROGADDITIONALINFOA_GEN_ALL_CORE
ATT NOTE:::           as above  pt seen examnd by me personally   labs   axr revd  Abdom  soft nontender   hernia Re Reduced  Coags still prolonged today    will recheck in AM  Apprec Dr House note re elevated bilki  Mult Flatus and BM x 1  Today       NPO aft Midnite   OR in AM ---> repair Hernia

## 2021-12-22 NOTE — CONSULT NOTE ADULT - ASSESSMENT
Impression: elevated bili mostly unconjugated suspect Gilbert's disease  no contraindication for planned surgery

## 2021-12-22 NOTE — PROVIDER CONTACT NOTE (CRITICAL VALUE NOTIFICATION) - SITUATION
hgb result 23 hours ago is 16.9, went down to 12.9 today 12/22/2021
Pt had 1 episode of SVT to 150's.

## 2021-12-23 ENCOUNTER — RESULT REVIEW (OUTPATIENT)
Age: 81
End: 2021-12-23

## 2021-12-23 ENCOUNTER — TRANSCRIPTION ENCOUNTER (OUTPATIENT)
Age: 81
End: 2021-12-23

## 2021-12-23 LAB
ALBUMIN SERPL ELPH-MCNC: 3.7 G/DL — SIGNIFICANT CHANGE UP (ref 3.3–5)
ALP SERPL-CCNC: 80 U/L — SIGNIFICANT CHANGE UP (ref 40–120)
ALT FLD-CCNC: 19 U/L — SIGNIFICANT CHANGE UP (ref 10–45)
ANION GAP SERPL CALC-SCNC: 11 MMOL/L — SIGNIFICANT CHANGE UP (ref 5–17)
APTT BLD: 31.2 SEC — SIGNIFICANT CHANGE UP (ref 27.5–35.5)
AST SERPL-CCNC: 25 U/L — SIGNIFICANT CHANGE UP (ref 10–40)
BILIRUB SERPL-MCNC: 2 MG/DL — HIGH (ref 0.2–1.2)
BLD GP AB SCN SERPL QL: NEGATIVE — SIGNIFICANT CHANGE UP
BUN SERPL-MCNC: 23 MG/DL — SIGNIFICANT CHANGE UP (ref 7–23)
CALCIUM SERPL-MCNC: 8.8 MG/DL — SIGNIFICANT CHANGE UP (ref 8.4–10.5)
CHLORIDE SERPL-SCNC: 95 MMOL/L — LOW (ref 96–108)
CO2 SERPL-SCNC: 31 MMOL/L — SIGNIFICANT CHANGE UP (ref 22–31)
CREAT SERPL-MCNC: 1.02 MG/DL — SIGNIFICANT CHANGE UP (ref 0.5–1.3)
GLUCOSE BLDC GLUCOMTR-MCNC: 121 MG/DL — HIGH (ref 70–99)
GLUCOSE BLDC GLUCOMTR-MCNC: 98 MG/DL — SIGNIFICANT CHANGE UP (ref 70–99)
GLUCOSE SERPL-MCNC: 109 MG/DL — HIGH (ref 70–99)
HCT VFR BLD CALC: 38.8 % — LOW (ref 39–50)
HGB BLD-MCNC: 12.9 G/DL — LOW (ref 13–17)
INR BLD: 1.13 — SIGNIFICANT CHANGE UP (ref 0.88–1.16)
MAGNESIUM SERPL-MCNC: 2.6 MG/DL — SIGNIFICANT CHANGE UP (ref 1.6–2.6)
MCHC RBC-ENTMCNC: 33.2 GM/DL — SIGNIFICANT CHANGE UP (ref 32–36)
MCHC RBC-ENTMCNC: 36.2 PG — HIGH (ref 27–34)
MCV RBC AUTO: 109 FL — HIGH (ref 80–100)
NRBC # BLD: 0 /100 WBCS — SIGNIFICANT CHANGE UP (ref 0–0)
PHOSPHATE SERPL-MCNC: 2.1 MG/DL — LOW (ref 2.5–4.5)
PLATELET # BLD AUTO: 154 K/UL — SIGNIFICANT CHANGE UP (ref 150–400)
POTASSIUM SERPL-MCNC: 3.5 MMOL/L — SIGNIFICANT CHANGE UP (ref 3.5–5.3)
POTASSIUM SERPL-SCNC: 3.5 MMOL/L — SIGNIFICANT CHANGE UP (ref 3.5–5.3)
PROT SERPL-MCNC: 6.8 G/DL — SIGNIFICANT CHANGE UP (ref 6–8.3)
PROTHROM AB SERPL-ACNC: 13.5 SEC — SIGNIFICANT CHANGE UP (ref 10.6–13.6)
RBC # BLD: 3.56 M/UL — LOW (ref 4.2–5.8)
RBC # FLD: 14.5 % — SIGNIFICANT CHANGE UP (ref 10.3–14.5)
RH IG SCN BLD-IMP: POSITIVE — SIGNIFICANT CHANGE UP
SARS-COV-2 RNA SPEC QL NAA+PROBE: SIGNIFICANT CHANGE UP
SODIUM SERPL-SCNC: 137 MMOL/L — SIGNIFICANT CHANGE UP (ref 135–145)
WBC # BLD: 5.55 K/UL — SIGNIFICANT CHANGE UP (ref 3.8–10.5)
WBC # FLD AUTO: 5.55 K/UL — SIGNIFICANT CHANGE UP (ref 3.8–10.5)

## 2021-12-23 PROCEDURE — 74019 RADEX ABDOMEN 2 VIEWS: CPT | Mod: 26

## 2021-12-23 PROCEDURE — 88302 TISSUE EXAM BY PATHOLOGIST: CPT | Mod: 26

## 2021-12-23 RX ORDER — HYDROMORPHONE HYDROCHLORIDE 2 MG/ML
0.25 INJECTION INTRAMUSCULAR; INTRAVENOUS; SUBCUTANEOUS
Refills: 0 | Status: DISCONTINUED | OUTPATIENT
Start: 2021-12-23 | End: 2021-12-23

## 2021-12-23 RX ORDER — LEVOTHYROXINE SODIUM 125 MCG
50 TABLET ORAL DAILY
Refills: 0 | Status: DISCONTINUED | OUTPATIENT
Start: 2021-12-23 | End: 2021-12-24

## 2021-12-23 RX ORDER — ACETAMINOPHEN 500 MG
1000 TABLET ORAL ONCE
Refills: 0 | Status: COMPLETED | OUTPATIENT
Start: 2021-12-23 | End: 2021-12-23

## 2021-12-23 RX ORDER — SODIUM CHLORIDE 9 MG/ML
1000 INJECTION, SOLUTION INTRAVENOUS
Refills: 0 | Status: DISCONTINUED | OUTPATIENT
Start: 2021-12-23 | End: 2021-12-24

## 2021-12-23 RX ORDER — OXYCODONE AND ACETAMINOPHEN 5; 325 MG/1; MG/1
1 TABLET ORAL EVERY 4 HOURS
Refills: 0 | Status: DISCONTINUED | OUTPATIENT
Start: 2021-12-23 | End: 2021-12-24

## 2021-12-23 RX ORDER — METOPROLOL TARTRATE 50 MG
50 TABLET ORAL
Refills: 0 | Status: DISCONTINUED | OUTPATIENT
Start: 2021-12-23 | End: 2021-12-24

## 2021-12-23 RX ORDER — HEPARIN SODIUM 5000 [USP'U]/ML
5000 INJECTION INTRAVENOUS; SUBCUTANEOUS EVERY 8 HOURS
Refills: 0 | Status: DISCONTINUED | OUTPATIENT
Start: 2021-12-23 | End: 2021-12-24

## 2021-12-23 RX ORDER — ONDANSETRON 8 MG/1
4 TABLET, FILM COATED ORAL EVERY 6 HOURS
Refills: 0 | Status: DISCONTINUED | OUTPATIENT
Start: 2021-12-23 | End: 2021-12-24

## 2021-12-23 RX ORDER — ACETAMINOPHEN 500 MG
1000 TABLET ORAL ONCE
Refills: 0 | Status: DISCONTINUED | OUTPATIENT
Start: 2021-12-23 | End: 2021-12-23

## 2021-12-23 RX ORDER — OXYCODONE AND ACETAMINOPHEN 5; 325 MG/1; MG/1
2 TABLET ORAL EVERY 6 HOURS
Refills: 0 | Status: DISCONTINUED | OUTPATIENT
Start: 2021-12-23 | End: 2021-12-24

## 2021-12-23 RX ORDER — POTASSIUM PHOSPHATE, MONOBASIC POTASSIUM PHOSPHATE, DIBASIC 236; 224 MG/ML; MG/ML
15 INJECTION, SOLUTION INTRAVENOUS ONCE
Refills: 0 | Status: DISCONTINUED | OUTPATIENT
Start: 2021-12-23 | End: 2021-12-23

## 2021-12-23 RX ORDER — BUPIVACAINE 13.3 MG/ML
20 INJECTION, SUSPENSION, LIPOSOMAL INFILTRATION ONCE
Refills: 0 | Status: DISCONTINUED | OUTPATIENT
Start: 2021-12-23 | End: 2021-12-24

## 2021-12-23 RX ORDER — ACETAMINOPHEN 500 MG
650 TABLET ORAL ONCE
Refills: 0 | Status: COMPLETED | OUTPATIENT
Start: 2021-12-23 | End: 2021-12-23

## 2021-12-23 RX ORDER — POTASSIUM CHLORIDE 20 MEQ
10 PACKET (EA) ORAL
Refills: 0 | Status: COMPLETED | OUTPATIENT
Start: 2021-12-23 | End: 2021-12-23

## 2021-12-23 RX ADMIN — HYDROMORPHONE HYDROCHLORIDE 0.25 MILLIGRAM(S): 2 INJECTION INTRAMUSCULAR; INTRAVENOUS; SUBCUTANEOUS at 16:45

## 2021-12-23 RX ADMIN — Medication 100 MILLIEQUIVALENT(S): at 09:23

## 2021-12-23 RX ADMIN — HEPARIN SODIUM 5000 UNIT(S): 5000 INJECTION INTRAVENOUS; SUBCUTANEOUS at 06:23

## 2021-12-23 RX ADMIN — ONDANSETRON 4 MILLIGRAM(S): 8 TABLET, FILM COATED ORAL at 09:57

## 2021-12-23 RX ADMIN — HYDROMORPHONE HYDROCHLORIDE 0.25 MILLIGRAM(S): 2 INJECTION INTRAMUSCULAR; INTRAVENOUS; SUBCUTANEOUS at 17:00

## 2021-12-23 RX ADMIN — Medication 1000 MILLIGRAM(S): at 07:05

## 2021-12-23 RX ADMIN — Medication 100 MILLIEQUIVALENT(S): at 18:39

## 2021-12-23 RX ADMIN — Medication 50 MICROGRAM(S): at 06:23

## 2021-12-23 RX ADMIN — Medication 100 MILLIEQUIVALENT(S): at 11:13

## 2021-12-23 RX ADMIN — Medication 400 MILLIGRAM(S): at 06:56

## 2021-12-23 RX ADMIN — HEPARIN SODIUM 5000 UNIT(S): 5000 INJECTION INTRAVENOUS; SUBCUTANEOUS at 22:26

## 2021-12-23 RX ADMIN — ONDANSETRON 4 MILLIGRAM(S): 8 TABLET, FILM COATED ORAL at 16:48

## 2021-12-23 RX ADMIN — Medication 5 MILLIGRAM(S): at 06:23

## 2021-12-23 RX ADMIN — Medication 650 MILLIGRAM(S): at 18:39

## 2021-12-23 NOTE — DISCHARGE NOTE PROVIDER - NSDCFUADDINST_GEN_ALL_CORE_FT
Please hold your home Xarelto until Sunday 12/26.     General Discharge Instructions:  Please resume all regular home medications unless specifically advised not to take a particular medication. Also, please take any new medications as prescribed.  Please get plenty of rest, continue to ambulate several times per day, and drink adequate amounts of fluids. Avoid lifting weights greater than 5-10 lbs until you follow-up with your surgeon, who will instruct you further regarding activity restrictions.  Avoid driving or operating heavy machinery while taking pain medications.  Please follow-up with your surgeon and Primary Care Provider (PCP) as advised.  Incision Care:  *Please call your doctor or nurse practitioner if you have increased pain, swelling, redness, or drainage from the incision site.  *Avoid swimming and baths until your follow-up appointment.  *You may shower, and wash surgical incisions with a mild soap and warm water. Gently pat the area dry.  *If you have staples, they will be removed at your follow-up appointment.  *If you have steri-strips, they will fall off on their own. Please remove any remaining strips 7-10 days after surgery.    Warning Signs:  Please call your doctor or nurse practitioner if you experience the following:  *You experience new chest pain, pressure, squeezing or tightness.  *New or worsening cough, shortness of breath, or wheeze.  *If you are vomiting and cannot keep down fluids or your medications.  *You are getting dehydrated due to continued vomiting, diarrhea, or other reasons. Signs of dehydration include dry mouth, rapid heartbeat, or feeling dizzy or faint when standing.  *You see blood or dark/black material when you vomit or have a bowel movement.  *You experience burning when you urinate, have blood in your urine, or experience a discharge.  *Your pain is not improving within 8-12 hours or is not gone within 24 hours. Call or return immediately if your pain is getting worse, changes location, or moves to your chest or back.  *You have shaking chills, or fever greater than 101.5 degrees Fahrenheit or 38 degrees Celsius.  *Any change in your symptoms, or any new symptoms that concern you.     Please hold your home Xarelto until Sunday 12/26.     Please take Tylenol for pain every 6 hours. Take Percocet only for severe pain - do not combine with tylenol.     General Discharge Instructions:  Please resume all regular home medications unless specifically advised not to take a particular medication. Also, please take any new medications as prescribed.  Please get plenty of rest, continue to ambulate several times per day, and drink adequate amounts of fluids. Avoid lifting weights greater than 5-10 lbs until you follow-up with your surgeon, who will instruct you further regarding activity restrictions.  Avoid driving or operating heavy machinery while taking pain medications.  Please follow-up with your surgeon and Primary Care Provider (PCP) as advised.  Incision Care:  *Please call your doctor or nurse practitioner if you have increased pain, swelling, redness, or drainage from the incision site.  *Avoid swimming and baths until your follow-up appointment.  *You may shower, and wash surgical incisions with a mild soap and warm water. Gently pat the area dry.  *If you have staples, they will be removed at your follow-up appointment.  *If you have steri-strips, they will fall off on their own. Please remove any remaining strips 7-10 days after surgery.    Warning Signs:  Please call your doctor or nurse practitioner if you experience the following:  *You experience new chest pain, pressure, squeezing or tightness.  *New or worsening cough, shortness of breath, or wheeze.  *If you are vomiting and cannot keep down fluids or your medications.  *You are getting dehydrated due to continued vomiting, diarrhea, or other reasons. Signs of dehydration include dry mouth, rapid heartbeat, or feeling dizzy or faint when standing.  *You see blood or dark/black material when you vomit or have a bowel movement.  *You experience burning when you urinate, have blood in your urine, or experience a discharge.  *Your pain is not improving within 8-12 hours or is not gone within 24 hours. Call or return immediately if your pain is getting worse, changes location, or moves to your chest or back.  *You have shaking chills, or fever greater than 101.5 degrees Fahrenheit or 38 degrees Celsius.  *Any change in your symptoms, or any new symptoms that concern you.     Please hold your home Xarelto until Sunday 12/26.     Please take Tylenol for pain every 6 hours.  Do not exceed 4000mg daily.    You may shower everyday. Remove dressing on Monday and Make a follow up appointment with Dr. Rust.     General Discharge Instructions:  Please resume all regular home medications unless specifically advised not to take a particular medication. Also, please take any new medications as prescribed.  Please get plenty of rest, continue to ambulate several times per day, and drink adequate amounts of fluids. Avoid lifting weights greater than 5-10 lbs until you follow-up with your surgeon, who will instruct you further regarding activity restrictions.  Avoid driving or operating heavy machinery while taking pain medications.  Please follow-up with your surgeon and Primary Care Provider (PCP) as advised.  Incision Care:  *Please call your doctor or nurse practitioner if you have increased pain, swelling, redness, or drainage from the incision site.  *Avoid swimming and baths until your follow-up appointment.  *You may shower, and wash surgical incisions with a mild soap and warm water. Gently pat the area dry.  *If you have staples, they will be removed at your follow-up appointment.  *If you have steri-strips, they will fall off on their own. Please remove any remaining strips 7-10 days after surgery.    Warning Signs:  Please call your doctor or nurse practitioner if you experience the following:  *You experience new chest pain, pressure, squeezing or tightness.  *New or worsening cough, shortness of breath, or wheeze.  *If you are vomiting and cannot keep down fluids or your medications.  *You are getting dehydrated due to continued vomiting, diarrhea, or other reasons. Signs of dehydration include dry mouth, rapid heartbeat, or feeling dizzy or faint when standing.  *You see blood or dark/black material when you vomit or have a bowel movement.  *You experience burning when you urinate, have blood in your urine, or experience a discharge.  *Your pain is not improving within 8-12 hours or is not gone within 24 hours. Call or return immediately if your pain is getting worse, changes location, or moves to your chest or back.  *You have shaking chills, or fever greater than 101.5 degrees Fahrenheit or 38 degrees Celsius.  *Any change in your symptoms, or any new symptoms that concern you.

## 2021-12-23 NOTE — BRIEF OPERATIVE NOTE - OPERATION/FINDINGS
Patient prepped and draped. Umbilical hernia identified. Incision made infraumbilically, hernia sac bluntly dissected and resected. Mesh placed in the preperitoneal space and sutured into place with 1-0 prolene. Fascia closed with 3-0 vicryl. Skin closed with 4-0 monocryl

## 2021-12-23 NOTE — PACU DISCHARGE NOTE - COMMENTS
949-01  Hand off report was given to Sharlene Subramanian. Cleared by the anesthesiologist to be discharged from PACU to Sharlene Engel. Surgical site intact.

## 2021-12-23 NOTE — DISCHARGE NOTE PROVIDER - NSDCCPCAREPLAN_GEN_ALL_CORE_FT
PRINCIPAL DISCHARGE DIAGNOSIS  Diagnosis: Umbilical hernia  Assessment and Plan of Treatment:       SECONDARY DISCHARGE DIAGNOSES  Diagnosis: Small bowel obstruction  Assessment and Plan of Treatment:

## 2021-12-23 NOTE — DISCHARGE NOTE PROVIDER - CARE PROVIDER_API CALL
Federico Rust  SURGERY  [address], [address]  [place] [address]  Phone: [phone]  Fax: [phone]  Follow Up Time:

## 2021-12-23 NOTE — DISCHARGE NOTE PROVIDER - HOSPITAL COURSE
81M with pmh of HTN, HLD, Afib/aflutter (on Xarelto, last taken 12/20 PM), hypothyroidism, T2DM, ?SBO vs constipation (managed with NG decompression) and no previous abdominal surgeries presents from home with four day history of PO intolerance, and obstipation. Clinical and radiographic findings c/w with SBO likely 2/2 to incarcerated umbilical hernia  s/p umbilical hernia repair with mesh on 12/23. Patient's post-operative course was uncomplicated. Diet was advanced as tolerated and pain was well controlled on medication. On day of discharge, pt deemed stable and ready to return home with plan to follow up as an outpatient.

## 2021-12-23 NOTE — DIETITIAN INITIAL EVALUATION ADULT. - OTHER INFO
81M with pmh of HTN, HLD, Afib/aflutter (on Xarelto, last taken 12/20 PM), hypothyroidism, T2DM, ?SBO vs constipation (managed with NG decompression) and no previous abdominal surgeries presents from home with four day history of PO intolerance, and obstipation. Clinical and radiographic findings c/w with SBO likely 2/2 to incarcerated umbilical hernia now easily reduced at bedside. Planned of OR 12/23.    On assessment, pt seen by bedside. At this time, pt was feeling down, listless, limited assessment d/t pt current emotional status. Awaiting for OR. Reports being NPO since adm, eager to eat. Discussed with pt current diet order and likely plan for diet progression. Pt verbalized understanding. Noted headache 6/10, no n/v/d/c. +BM 12/22. Skin: Michael 21, no PU or edema noted. RD to follow per protocol. See below for nutr recs.

## 2021-12-23 NOTE — DISCHARGE NOTE PROVIDER - NSDCMRMEDTOKEN_GEN_ALL_CORE_FT
atorvastatin 40 mg oral tablet: 1 tab(s) orally once a day  Klor-Con: 40 milliequivalent(s) orally once a day    Lasix 80 mg oral tablet: 1 tab(s) orally 2 times a day   levothyroxine 50 mcg (0.05 mg) oral capsule: 1 cap(s) orally once a day  metFORMIN 750 mg oral tablet, extended release: 2 tab(s) orally once a day  metOLazone 5 mg oral tablet: orally once a week  Metoprolol Tartrate 50 mg oral tablet: 1 tab(s) orally 2 times a day   atorvastatin 40 mg oral tablet: 1 tab(s) orally once a day  Klor-Con: 40 milliequivalent(s) orally once a day    Lasix 80 mg oral tablet: 1 tab(s) orally 2 times a day   levothyroxine 50 mcg (0.05 mg) oral tablet: 1 tab(s) orally once a day  metFORMIN 750 mg oral tablet, extended release: 2 tab(s) orally once a day  metOLazone 5 mg oral tablet: orally once a week  Metoprolol Tartrate 50 mg oral tablet: 1 tab(s) orally 2 times a day  Xarelto 20 mg oral tablet: 1 tab(s) orally once a day (in the evening)

## 2021-12-23 NOTE — DIETITIAN INITIAL EVALUATION ADULT. - ADD RECOMMEND
1. NPO, adv diet when medically feasible, rec CLD>>Full liq >> low fiber diet 2. Monitor for GI discomfort/distention, s/s of intolerance 3. BM and pain per team 4. Monitor BMP, BG q6hrs, POCT glu, lytes, replete prn

## 2021-12-24 ENCOUNTER — TRANSCRIPTION ENCOUNTER (OUTPATIENT)
Age: 81
End: 2021-12-24

## 2021-12-24 VITALS
HEART RATE: 78 BPM | SYSTOLIC BLOOD PRESSURE: 148 MMHG | TEMPERATURE: 98 F | RESPIRATION RATE: 17 BRPM | OXYGEN SATURATION: 95 % | DIASTOLIC BLOOD PRESSURE: 82 MMHG

## 2021-12-24 LAB
ANION GAP SERPL CALC-SCNC: 13 MMOL/L — SIGNIFICANT CHANGE UP (ref 5–17)
BUN SERPL-MCNC: 21 MG/DL — SIGNIFICANT CHANGE UP (ref 7–23)
CALCIUM SERPL-MCNC: 8.8 MG/DL — SIGNIFICANT CHANGE UP (ref 8.4–10.5)
CHLORIDE SERPL-SCNC: 96 MMOL/L — SIGNIFICANT CHANGE UP (ref 96–108)
CO2 SERPL-SCNC: 27 MMOL/L — SIGNIFICANT CHANGE UP (ref 22–31)
CREAT SERPL-MCNC: 1.13 MG/DL — SIGNIFICANT CHANGE UP (ref 0.5–1.3)
GLUCOSE SERPL-MCNC: 113 MG/DL — HIGH (ref 70–99)
HCT VFR BLD CALC: 39.9 % — SIGNIFICANT CHANGE UP (ref 39–50)
HGB BLD-MCNC: 13 G/DL — SIGNIFICANT CHANGE UP (ref 13–17)
MAGNESIUM SERPL-MCNC: 2.3 MG/DL — SIGNIFICANT CHANGE UP (ref 1.6–2.6)
MCHC RBC-ENTMCNC: 32.6 GM/DL — SIGNIFICANT CHANGE UP (ref 32–36)
MCHC RBC-ENTMCNC: 34.9 PG — HIGH (ref 27–34)
MCV RBC AUTO: 107 FL — HIGH (ref 80–100)
NRBC # BLD: 0 /100 WBCS — SIGNIFICANT CHANGE UP (ref 0–0)
PHOSPHATE SERPL-MCNC: 2.3 MG/DL — LOW (ref 2.5–4.5)
PLATELET # BLD AUTO: 155 K/UL — SIGNIFICANT CHANGE UP (ref 150–400)
POTASSIUM SERPL-MCNC: 3.6 MMOL/L — SIGNIFICANT CHANGE UP (ref 3.5–5.3)
POTASSIUM SERPL-SCNC: 3.6 MMOL/L — SIGNIFICANT CHANGE UP (ref 3.5–5.3)
RBC # BLD: 3.73 M/UL — LOW (ref 4.2–5.8)
RBC # FLD: 14.1 % — SIGNIFICANT CHANGE UP (ref 10.3–14.5)
SODIUM SERPL-SCNC: 136 MMOL/L — SIGNIFICANT CHANGE UP (ref 135–145)
WBC # BLD: 6.6 K/UL — SIGNIFICANT CHANGE UP (ref 3.8–10.5)
WBC # FLD AUTO: 6.6 K/UL — SIGNIFICANT CHANGE UP (ref 3.8–10.5)

## 2021-12-24 RX ORDER — BENZOCAINE AND MENTHOL 5; 1 G/100ML; G/100ML
1 LIQUID ORAL ONCE
Refills: 0 | Status: DISCONTINUED | OUTPATIENT
Start: 2021-12-24 | End: 2021-12-24

## 2021-12-24 RX ORDER — LEVOTHYROXINE SODIUM 125 MCG
1 TABLET ORAL
Qty: 0 | Refills: 0 | DISCHARGE

## 2021-12-24 RX ORDER — SODIUM,POTASSIUM PHOSPHATES 278-250MG
1 POWDER IN PACKET (EA) ORAL ONCE
Refills: 0 | Status: COMPLETED | OUTPATIENT
Start: 2021-12-24 | End: 2021-12-24

## 2021-12-24 RX ORDER — BENZOCAINE AND MENTHOL 5; 1 G/100ML; G/100ML
1 LIQUID ORAL ONCE
Refills: 0 | Status: COMPLETED | OUTPATIENT
Start: 2021-12-24 | End: 2021-12-24

## 2021-12-24 RX ORDER — POTASSIUM CHLORIDE 20 MEQ
20 PACKET (EA) ORAL ONCE
Refills: 0 | Status: COMPLETED | OUTPATIENT
Start: 2021-12-24 | End: 2021-12-24

## 2021-12-24 RX ORDER — ACETAMINOPHEN 500 MG
650 TABLET ORAL ONCE
Refills: 0 | Status: COMPLETED | OUTPATIENT
Start: 2021-12-24 | End: 2021-12-24

## 2021-12-24 RX ORDER — ALBUTEROL 90 UG/1
2 AEROSOL, METERED ORAL EVERY 6 HOURS
Refills: 0 | Status: DISCONTINUED | OUTPATIENT
Start: 2021-12-24 | End: 2021-12-24

## 2021-12-24 RX ORDER — RIVAROXABAN 15 MG-20MG
1 KIT ORAL
Qty: 0 | Refills: 0 | DISCHARGE
Start: 2021-12-24

## 2021-12-24 RX ORDER — LEVOTHYROXINE SODIUM 125 MCG
1 TABLET ORAL
Qty: 0 | Refills: 0 | DISCHARGE
Start: 2021-12-24

## 2021-12-24 RX ADMIN — Medication 650 MILLIGRAM(S): at 01:09

## 2021-12-24 RX ADMIN — ALBUTEROL 2 PUFF(S): 90 AEROSOL, METERED ORAL at 09:15

## 2021-12-24 RX ADMIN — Medication 50 MICROGRAM(S): at 06:09

## 2021-12-24 RX ADMIN — Medication 20 MILLIEQUIVALENT(S): at 09:15

## 2021-12-24 RX ADMIN — BENZOCAINE AND MENTHOL 1 LOZENGE: 5; 1 LIQUID ORAL at 06:40

## 2021-12-24 RX ADMIN — BENZOCAINE AND MENTHOL 1 LOZENGE: 5; 1 LIQUID ORAL at 01:08

## 2021-12-24 RX ADMIN — Medication 50 MILLIGRAM(S): at 06:09

## 2021-12-24 RX ADMIN — HEPARIN SODIUM 5000 UNIT(S): 5000 INJECTION INTRAVENOUS; SUBCUTANEOUS at 06:08

## 2021-12-24 RX ADMIN — Medication 1 PACKET(S): at 09:15

## 2021-12-24 RX ADMIN — Medication 650 MILLIGRAM(S): at 02:00

## 2021-12-24 NOTE — PROGRESS NOTE ADULT - ASSESSMENT
discharge today   follow up with Dr Cali and Dr Rust
for R today per Dr Barrera VARELA repleted  Maryann impovisbarina
81M with pmh of HTN, HLD, Afib/aflutter (on Xarelto, last taken 12/20 PM), hypothyroidism, T2DM, ?SBO vs constipation (managed with NG decompression) and no previous abdominal surgeries presents from home with four day history of PO intolerance, and obstipation. Clinical and radiographic findings c/w with SBO likely 2/2 to incarcerated umbilical hernia now easily reduced at bedside now s/p umbilical hernia repair with mesh on 12/23    regular diet   Cards c/s (Viraj)- recommend holding anticoagulation, c/w BB, holding remaining diuretics  pain/nausea control  AM labs   mISS STATON  VTE PPX with SCDs and SQH  Discharge to home today - restart Xeralto on Sunday  
81M with pmh of HTN, HLD, Afib/aflutter (on Xarelto, last taken 12/20 PM), hypothyroidism, T2DM, ?SBO vs constipation (managed with NG decompression) and no previous abdominal surgeries presents from home with four day history of PO intolerance, and obstipation. Clinical and radiographic findings c/w with SBO likely 2/2 to incarcerated umbilical hernia now easily reduced at bedside.    NPO for OR today  Strict I&Os  Cards c/s (Viraj)- recommend holding anticoagulation, c/w BB, holding remaining diuretics  F/u 2 view AXR  pain/nausea control  AM labs   mISS STATON  VTE PPX with SCDs and SQH  
81M with pmh of HTN, HLD, Afib/aflutter (on Xarelto, last taken 12/20 PM), hypothyroidism, T2DM, ?SBO vs constipation (managed with NG decompression) and no previous abdominal surgeries presents from home with four day history of PO intolerance, and obstipation. Clinical and radiographic findings c/w with SBO likely 2/2 to incarcerated umbilical hernia now easily reduced at bedside. Pt seen and examined bedside with Dr. Escobedo.     Plan:  Admit to general surgery team 5 under Dr. Rust  CLD/IVF  Strict I&Os  Repeat lactate 8PM  Cards c/s (Terrancei)- recommend holding anticoagulation, c/w BB, holding remaining diuretics  F/u 2 view AXR  F/u CXR  F/u TTE  F/u AM labs  Analgesics PRN  Antiemetics PRN  mISS STATON  VTE PPX with SCDs and SQH  Plan discussed with Dr. Escobedo

## 2021-12-24 NOTE — PROGRESS NOTE ADULT - REASON FOR ADMISSION
Umbilical hernia w/ SBO
Umbilical hernia w/ SBO
intestinal obstruction
Umbilical hernia w/ SBO

## 2021-12-24 NOTE — DISCHARGE NOTE NURSING/CASE MANAGEMENT/SOCIAL WORK - PATIENT PORTAL LINK FT
You can access the FollowMyHealth Patient Portal offered by [facility] by registering at the following website: [email or web address]. By joining [employer]’s FollowMyHealth portal, you will also be able to view your health information using other applications (apps) compatible with our system.

## 2021-12-24 NOTE — DISCHARGE NOTE NURSING/CASE MANAGEMENT/SOCIAL WORK - NSDCPEFALRISK_GEN_ALL_CORE
For information on Fall & Injury Prevention, visit: [email or web address] OR  [email or web address] OR  https://www.cdc.gov/steadi/patient.html

## 2021-12-24 NOTE — PROGRESS NOTE ADULT - SUBJECTIVE AND OBJECTIVE BOX
SUBJECTIVE: Patient seen and examined bedside by chief resident. patient passing flatus and having BMs. No acute complaints.     heparin   Injectable 5000 Unit(s) SubCutaneous every 8 hours  metoprolol tartrate Injectable 5 milliGRAM(s) IV Push every 6 hours      Vital Signs Last 24 Hrs  T(C): 36.7 (22 Dec 2021 05:55), Max: 36.9 (21 Dec 2021 17:27)  T(F): 98.1 (22 Dec 2021 05:55), Max: 98.5 (21 Dec 2021 19:00)  HR: 66 (22 Dec 2021 05:55) (63 - 97)  BP: 119/58 (22 Dec 2021 05:55) (119/58 - 147/78)  BP(mean): --  RR: 18 (22 Dec 2021 05:55) (17 - 20)  SpO2: 97% (22 Dec 2021 05:55) (93% - 97%)  I&O's Detail    21 Dec 2021 07:01  -  22 Dec 2021 06:52  --------------------------------------------------------  IN:    IV PiggyBack: 100 mL    sodium chloride 0.9%: 485 mL  Total IN: 585 mL    OUT:    Voided (mL): 200 mL  Total OUT: 200 mL    Total NET: 385 mL          Physical Exam:  General: No acute distress, resting comfortably in bed  C/V: irregular rhythm rate controlled   Pulm: Nonlabored breathing, no respiratory distress  Abd: soft, non-tender,  slight distension, umbilical hernia with slight redness that is reducible   Extrem: warm and well perfused, no edema, SCDs in place    LABS:                        16.9   7.80  )-----------( 213      ( 21 Dec 2021 13:04 )             50.4     12-    131<L>  |  86<L>  |  34<H>  ----------------------------<  126<H>  3.6   |  33<H>  |  1.41<H>    Ca    8.7      22 Dec 2021 00:03    TPro  6.9  /  Alb  4.0  /  TBili  3.3<H>  /  DBili  x   /  AST  29  /  ALT  21  /  AlkPhos  83  12-22    PT/INR - ( 21 Dec 2021 13:04 )   PT: 27.9 sec;   INR: 2.43          PTT - ( 21 Dec 2021 13:04 )  PTT:38.7 sec  Urinalysis Basic - ( 21 Dec 2021 14:21 )    Color: Yellow / Appearance: Clear / S.015 / pH: x  Gluc: x / Ketone: NEGATIVE  / Bili: Negative / Urobili: 1.0 E.U./dL   Blood: x / Protein: 100 mg/dL / Nitrite: NEGATIVE   Leuk Esterase: NEGATIVE / RBC: > 10 /HPF / WBC < 5 /HPF   Sq Epi: x / Non Sq Epi: 0-5 /HPF / Bacteria: Present /HPF        RADIOLOGY & ADDITIONAL STUDIES:    81M with pmh of HTN, HLD, Afib/aflutter (on Xarelto, last taken  PM), hypothyroidism, T2DM, questionable past hx SBO vs constipation (managed with NG decompression) and no previous abdominal surgeries presents from home with four day history of PO intolerance, and obstipation. Clinical and radiographic findings c/w with SBO likely 2/2 to incarcerated umbilical hernia now easily reduced at bedside. Possible OR today      CLD, NPO @ noon on /IVF  Strict I&Os  Cards c/s (Viraj)- recommend holding anticoagulation, c/w BB, holding remaining diuretics  AM AXR  F/u TTE  F/u AM labs  Analgesics PRN  Antiemetics PRN  mISS STATON  VTE PPX with SCDs and SQH        
SUBJECTIVE: Patient seen and examined bedside in the ED with Dr. Rust. Pt's umbilical hernia successfully reduced bedside. Pt reports feeling well at present. Denies nausea or vomiting. Denies flatus or BMs at this time. Discussed plan for admission to telemetry bed with plan for hernia repair during this admission.    heparin   Injectable 5000 Unit(s) SubCutaneous every 8 hours  metoprolol tartrate IVPB 10 milliGRAM(s) IV Intermittent every 6 hours      Vital Signs Last 24 Hrs  T(C): 36.9 (21 Dec 2021 19:00), Max: 36.9 (21 Dec 2021 17:27)  T(F): 98.5 (21 Dec 2021 19:00), Max: 98.5 (21 Dec 2021 19:00)  HR: 63 (21 Dec 2021 19:00) (63 - 97)  BP: 130/78 (21 Dec 2021 19:00) (130/78 - 146/89)  BP(mean): --  RR: 18 (21 Dec 2021 19:00) (17 - 18)  SpO2: 93% (21 Dec 2021 19:00) (93% - 97%)  I&O's Detail      General: NAD, resting comfortably in bed  C/V: NSR  Pulm: Nonlabored breathing, no respiratory distress  Abd: soft, moderately distended, nontender, large umbilical hernia now reducible, no rebound, no guarding  Extrem: WWP, no edema, SCDs in place        LABS:                        16.9   7.80  )-----------( 213      ( 21 Dec 2021 13:04 )             50.4     12-    136  |  76<L>  |  27<H>  ----------------------------<  146<H>  3.1<L>   |  44<H>  |  1.17    Ca    10.4      21 Dec 2021 13:04    TPro  8.4<H>  /  Alb  4.6  /  TBili  3.6<H>  /  DBili  0.5<H>  /  AST  36  /  ALT  26  /  AlkPhos  107  12-21    PT/INR - ( 21 Dec 2021 13:04 )   PT: 27.9 sec;   INR: 2.43          PTT - ( 21 Dec 2021 13:04 )  PTT:38.7 sec  Urinalysis Basic - ( 21 Dec 2021 14:21 )    Color: Yellow / Appearance: Clear / S.015 / pH: x  Gluc: x / Ketone: NEGATIVE  / Bili: Negative / Urobili: 1.0 E.U./dL   Blood: x / Protein: 100 mg/dL / Nitrite: NEGATIVE   Leuk Esterase: NEGATIVE / RBC: > 10 /HPF / WBC < 5 /HPF   Sq Epi: x / Non Sq Epi: 0-5 /HPF / Bacteria: Present /HPF        RADIOLOGY & ADDITIONAL STUDIES:  
STATUS POST:  laparoscopic cholecystectomy    SUBJECTIVE: Pt seen and examined post op. patient feeling well, better than pre op. is hungry and is ordering food. denies nausea or vomiting. would like to stay the night to recover    Vital Signs Last 24 Hrs  T(C): 36.6 (23 Dec 2021 16:50), Max: 36.7 (22 Dec 2021 21:20)  T(F): 97.8 (23 Dec 2021 16:50), Max: 98.1 (23 Dec 2021 05:38)  HR: 72 (23 Dec 2021 17:05) (60 - 75)  BP: 143/67 (23 Dec 2021 16:50) (129/69 - 161/70)  BP(mean): 97 (23 Dec 2021 16:50) (92 - 102)  RR: 20 (23 Dec 2021 17:05) (16 - 28)  SpO2: 95% (23 Dec 2021 17:05) (88% - 96%)  I&O's Summary    22 Dec 2021 07:01  -  23 Dec 2021 07:00  --------------------------------------------------------  IN: 2440 mL / OUT: 800 mL / NET: 1640 mL    23 Dec 2021 07:01  -  23 Dec 2021 17:40  --------------------------------------------------------  IN: 50 mL / OUT: 0 mL / NET: 50 mL      I&O's Detail    22 Dec 2021 07:01  -  23 Dec 2021 07:00  --------------------------------------------------------  IN:    IV PiggyBack: 100 mL    Oral Fluid: 300 mL    sodium chloride 0.9%: 2040 mL  Total IN: 2440 mL    OUT:    Voided (mL): 800 mL  Total OUT: 800 mL    Total NET: 1640 mL      23 Dec 2021 07:01  -  23 Dec 2021 17:40  --------------------------------------------------------  IN:    Lactated Ringers: 50 mL  Total IN: 50 mL    OUT:  Total OUT: 0 mL    Total NET: 50 mL            LABS:                        12.9   5.55  )-----------( 154      ( 23 Dec 2021 06:58 )             38.8     12-23    137  |  95<L>  |  23  ----------------------------<  109<H>  3.5   |  31  |  1.02    Ca    8.8      23 Dec 2021 06:58  Phos  2.1     12-23  Mg     2.6     12-23    TPro  6.8  /  Alb  3.7  /  TBili  2.0<H>  /  DBili  x   /  AST  25  /  ALT  19  /  AlkPhos  80  12-23    PT/INR - ( 23 Dec 2021 06:58 )   PT: 13.5 sec;   INR: 1.13          PTT - ( 23 Dec 2021 06:58 )  PTT:31.2 sec      Physical exam :  Neuro: Alert and Oriented X 4  Respiratory: CTA b/l. no respiratory distress  Cardiovascular: NSR, RRR  Gastrointestinal: soft, mild distention, appropriately tender around incision, dressing c/d/i  Extremities: (-) edema b/l     A/P: 81y Male   - Diet: regular diet   - Activity: OOBA  - Labs: AM labs   - Pain medication/ nausea control   - DVT ppx      
  SUBJECTIVE: Patient seen and examined bedside by chief resident.  patient feeling well. passing flatus and having BM. understands plan for surgery today    heparin   Injectable 5000 Unit(s) SubCutaneous every 8 hours  metoprolol tartrate Injectable 5 milliGRAM(s) IV Push every 6 hours      Vital Signs Last 24 Hrs  T(C): 36.7 (23 Dec 2021 06:20), Max: 36.7 (22 Dec 2021 21:20)  T(F): 98 (23 Dec 2021 06:20), Max: 98.1 (23 Dec 2021 05:38)  HR: 75 (23 Dec 2021 06:20) (60 - 75)  BP: 145/68 (23 Dec 2021 06:20) (127/80 - 160/71)  BP(mean): --  RR: 16 (23 Dec 2021 06:20) (16 - 18)  SpO2: 92% (23 Dec 2021 06:20) (92% - 96%)  I&O's Detail    22 Dec 2021 07:01  -  23 Dec 2021 07:00  --------------------------------------------------------  IN:    IV PiggyBack: 100 mL    Oral Fluid: 300 mL    sodium chloride 0.9%: 2040 mL  Total IN: 2440 mL    OUT:    Voided (mL): 800 mL  Total OUT: 800 mL    Total NET: 1640 mL          General: NAD, resting comfortably in bed  C/V: NSR  Pulm: Nonlabored breathing, no respiratory distress  Abd: soft, non-tender,  slight distension, umbilical hernia with slight redness that is reducible   Extrem: WWP, no edema, SCDs in place        LABS:                        12.9   5.55  )-----------( 154      ( 23 Dec 2021 06:58 )             38.8     12-22    131<L>  |  88<L>  |  27<H>  ----------------------------<  115<H>  3.4<L>   |  32<H>  |  1.17    Ca    8.6      22 Dec 2021 12:45  Phos  2.6     12-  Mg     2.7     12-    TPro  6.8  /  Alb  4.0  /  TBili  2.2<H>  /  DBili  0.4<H>  /  AST  33  /  ALT  21  /  AlkPhos  82  12-    PT/INR - ( 22 Dec 2021 23:03 )   PT: 13.7 sec;   INR: 1.15          PTT - ( 22 Dec 2021 23:03 )  PTT:30.2 sec  Urinalysis Basic - ( 21 Dec 2021 14:21 )    Color: Yellow / Appearance: Clear / S.015 / pH: x  Gluc: x / Ketone: NEGATIVE  / Bili: Negative / Urobili: 1.0 E.U./dL   Blood: x / Protein: 100 mg/dL / Nitrite: NEGATIVE   Leuk Esterase: NEGATIVE / RBC: > 10 /HPF / WBC < 5 /HPF   Sq Epi: x / Non Sq Epi: 0-5 /HPF / Bacteria: Present /HPF        RADIOLOGY & ADDITIONAL STUDIES:  
82 y/o male htn dm chol cardiomyopathy afib/aflutter refused ablation seen by ep dr jackson on a/c has not taken xarelto for several days admitted with incarcerated ventral hernia reduced by general surgery  hemodynamically stable alert lungs clear hear jennifer s1s2 2/6 holosystolic apical murmur bp 130/80 continue beta blocker order echo maintain off a/c plan for surgery as per dr atkins
82 y/o male htn dm chol hypothyroid cardiomyopathy aflutter refused ablation on beta blocker a/c off xarelto x4days followed by ep dr jackson incarcerated umbilical hernia reduced by surgery plan for surgery hemodynamically stable alert comfortable lungs clear heart jennifer s1s2 2/6 holosystolic apical murmur bp 130/80 f/u echo pt is cleared for surgery resume meds after surgery f/u office one week discussed with all concerned
Pt seen and examined   no complaints  some soreness   tolerating appetite    REVIEW OF SYSTEMS:  Constitutional: No fever,   Cardiovascular: No chest pain, palpitations, dizziness or leg swelling  Gastrointestinal:sore. No nausea, vomiting or hematemesis; No diarrhea No BMs  Skin: No itching, burning, rashes or lesions       MEDICATIONS:  MEDICATIONS  (STANDING):  BUpivacaine liposome 1.3% Injectable (no eMAR) 20 milliLiter(s) Local Injection once  heparin   Injectable 5000 Unit(s) SubCutaneous every 8 hours  lactated ringers. 1000 milliLiter(s) (50 mL/Hr) IV Continuous <Continuous>  levothyroxine 50 MICROGram(s) Oral daily  metoprolol tartrate 50 milliGRAM(s) Oral two times a day    MEDICATIONS  (PRN):  ALBUTerol    90 MICROgram(s) HFA Inhaler 2 Puff(s) Inhalation every 6 hours PRN Shortness of Breath  ondansetron Injectable 4 milliGRAM(s) IV Push every 6 hours PRN Nausea  oxycodone    5 mG/acetaminophen 325 mG 1 Tablet(s) Oral every 4 hours PRN Moderate Pain (4 - 6)  oxycodone    5 mG/acetaminophen 325 mG 2 Tablet(s) Oral every 6 hours PRN Severe Pain (7 - 10)      Allergies    penicillin (Rash)  tetracycline (Rash)    Intolerances        Vital Signs Last 24 Hrs  T(C): 37 (24 Dec 2021 09:28), Max: 37 (24 Dec 2021 09:28)  T(F): 98.6 (24 Dec 2021 09:28), Max: 98.6 (24 Dec 2021 09:28)  HR: 79 (24 Dec 2021 09:28) (68 - 79)  BP: 154/86 (24 Dec 2021 09:28) (135/64 - 169/77)  BP(mean): 97 (23 Dec 2021 16:50) (92 - 102)  RR: 18 (24 Dec 2021 09:28) (16 - 28)  SpO2: 92% (24 Dec 2021 09:28) (88% - 98%)    12-23 @ 07:01  -  12-24 @ 07:00  --------------------------------------------------------  IN: 50 mL / OUT: 350 mL / NET: -300 mL    12-24 @ 07:01  -  12-24 @ 12:45  --------------------------------------------------------  IN: 240 mL / OUT: 0 mL / NET: 240 mL        PHYSICAL EXAM:    General: Well developed; well nourished; in no acute distress  HEENT: MMM, conjunctiva and sclera clear  Gastrointestinal: Soft non-tender non-distended; Normal bowel sounds;   Skin: Warm and dry. No obvious rash    LABS:      CBC Full  -  ( 24 Dec 2021 06:13 )  WBC Count : 6.60 K/uL  RBC Count : 3.73 M/uL  Hemoglobin : 13.0 g/dL  Hematocrit : 39.9 %  Platelet Count - Automated : 155 K/uL  Mean Cell Volume : 107.0 fl  Mean Cell Hemoglobin : 34.9 pg  Mean Cell Hemoglobin Concentration : 32.6 gm/dL  Auto Neutrophil # : x  Auto Lymphocyte # : x  Auto Monocyte # : x  Auto Eosinophil # : x  Auto Basophil # : x  Auto Neutrophil % : x  Auto Lymphocyte % : x  Auto Monocyte % : x  Auto Eosinophil % : x  Auto Basophil % : x    12-24    136  |  96  |  21  ----------------------------<  113<H>  3.6   |  27  |  1.13    Ca    8.8      24 Dec 2021 06:13  Phos  2.3     12-24  Mg     2.3     12-24    TPro  6.8  /  Alb  3.7  /  TBili  2.0<H>  /  DBili  x   /  AST  25  /  ALT  19  /  AlkPhos  80  12-23    PT/INR - ( 23 Dec 2021 06:58 )   PT: 13.5 sec;   INR: 1.13          PTT - ( 23 Dec 2021 06:58 )  PTT:31.2 sec                  RADIOLOGY & ADDITIONAL STUDIES (The following images were personally reviewed):
  STATUS POST:  Repair, hernia, umbilical, adult    POST OPERATIVE DAY #: 1    SUBJECTIVE:   No acute events overnight. Passed TOV. Feels stressed out. Pain controlled. Passing gas. No bowel movement. Denies nausea/vomiting. Tolerating diet.     ---------------------------------------------------------------    Vital Signs Last 24 Hrs  T(C): 36.7 (24 Dec 2021 12:52), Max: 37 (24 Dec 2021 09:28)  T(F): 98.1 (24 Dec 2021 12:52), Max: 98.6 (24 Dec 2021 09:28)  HR: 78 (24 Dec 2021 12:52) (74 - 79)  BP: 148/82 (24 Dec 2021 12:52) (140/63 - 169/77)  BP(mean): --  RR: 17 (24 Dec 2021 12:52) (17 - 19)  SpO2: 95% (24 Dec 2021 12:52) (91% - 98%)    I&O's Detail    23 Dec 2021 07:01  -  24 Dec 2021 07:00  --------------------------------------------------------  IN:    Lactated Ringers: 50 mL  Total IN: 50 mL    OUT:    Voided (mL): 350 mL  Total OUT: 350 mL    Total NET: -300 mL      24 Dec 2021 07:01  -  24 Dec 2021 17:15  --------------------------------------------------------  IN:    IV PiggyBack: 600 mL    Oral Fluid: 1220 mL  Total IN: 1820 mL    OUT:  Total OUT: 0 mL    Total NET: 1820 mL          ----------------------------------------------------------------    Physical Exam:  Neuro: Alert and Oriented X 4  Respiratory: CTA b/l. no respiratory distress  Cardiovascular: NSR, RRR  Gastrointestinal: soft, mild distention, appropriately tender around incision, dressing c/d/i  Extremities: (-) edema b/l     -------------------------------------------------------------------    LABS:                        13.0   6.60  )-----------( 155      ( 24 Dec 2021 06:13 )             39.9     12-24    136  |  96  |  21  ----------------------------<  113<H>  3.6   |  27  |  1.13    Ca    8.8      24 Dec 2021 06:13  Phos  2.3     12-24  Mg     2.3     12-24    TPro  6.8  /  Alb  3.7  /  TBili  2.0<H>  /  DBili  x   /  AST  25  /  ALT  19  /  AlkPhos  80  12-23    PT/INR - ( 23 Dec 2021 06:58 )   PT: 13.5 sec;   INR: 1.13          PTT - ( 23 Dec 2021 06:58 )  PTT:31.2 sec        RADIOLOGY & ADDITIONAL STUDIES:    
Pt seen and examined   feels better  express eagerness tohave surgery over with sohe can walk and eat      REVIEW OF SYSTEMS:  Constitutional: No fever, weight loss or fatigue  Cardiovascular: No chest pain, palpitations,   Gastrointestinal: No abdominal or epigastric pain. No nausea, vomiting  Skin: No itching, burning, rashes or lesions       MEDICATIONS:  MEDICATIONS  (STANDING):  atorvastatin 40 milliGRAM(s) Oral at bedtime  dextrose 40% Gel 15 Gram(s) Oral once  dextrose 5%. 1000 milliLiter(s) (50 mL/Hr) IV Continuous <Continuous>  dextrose 5%. 1000 milliLiter(s) (100 mL/Hr) IV Continuous <Continuous>  dextrose 50% Injectable 25 Gram(s) IV Push once  dextrose 50% Injectable 12.5 Gram(s) IV Push once  dextrose 50% Injectable 25 Gram(s) IV Push once  glucagon  Injectable 1 milliGRAM(s) IntraMuscular once  heparin   Injectable 5000 Unit(s) SubCutaneous every 8 hours  insulin lispro (ADMELOG) corrective regimen sliding scale   SubCutaneous three times a day before meals  levothyroxine 50 MICROGram(s) Oral daily  metoprolol tartrate Injectable 5 milliGRAM(s) IV Push every 6 hours  potassium chloride  10 mEq/100 mL IVPB 10 milliEquivalent(s) IV Intermittent every 1 hour  potassium phosphate IVPB 15 milliMole(s) IV Intermittent once  sodium chloride 0.9%. 1000 milliLiter(s) (85 mL/Hr) IV Continuous <Continuous>    MEDICATIONS  (PRN):  ondansetron Injectable 4 milliGRAM(s) IV Push every 6 hours PRN Nausea and/or Vomiting      Allergies    penicillin (Rash)  tetracycline (Rash)    Intolerances        Vital Signs Last 24 Hrs  T(C): 36.2 (23 Dec 2021 09:41), Max: 36.7 (22 Dec 2021 21:20)  T(F): 97.1 (23 Dec 2021 09:41), Max: 98.1 (23 Dec 2021 05:38)  HR: 65 (23 Dec 2021 09:02) (60 - 75)  BP: 133/66 (23 Dec 2021 09:02) (129/69 - 160/71)  BP(mean): --  RR: 17 (23 Dec 2021 09:02) (16 - 18)  SpO2: 93% (23 Dec 2021 09:41) (92% - 95%)    12-22 @ 07:01  -   @ 07:00  --------------------------------------------------------  IN: 2440 mL / OUT: 800 mL / NET: 1640 mL        PHYSICAL EXAM:    General:  in no acute distress  HEENT: MMM, conjunctiva and sclera clear  Gastrointestinal: Soft non-tender non-distended; Normal bowel sounds;   Skin: Warm and dry. No obvious rash    LABS:      CBC Full  -  ( 23 Dec 2021 06:58 )  WBC Count : 5.55 K/uL  RBC Count : 3.56 M/uL  Hemoglobin : 12.9 g/dL  Hematocrit : 38.8 %  Platelet Count - Automated : 154 K/uL  Mean Cell Volume : 109.0 fl  Mean Cell Hemoglobin : 36.2 pg  Mean Cell Hemoglobin Concentration : 33.2 gm/dL  Auto Neutrophil # : x  Auto Lymphocyte # : x  Auto Monocyte # : x  Auto Eosinophil # : x  Auto Basophil # : x  Auto Neutrophil % : x  Auto Lymphocyte % : x  Auto Monocyte % : x  Auto Eosinophil % : x  Auto Basophil % : x        137  |  95<L>  |  23  ----------------------------<  109<H>  3.5   |  31  |  1.02    Ca    8.8      23 Dec 2021 06:58  Phos  2.1       Mg     2.6         TPro  6.8  /  Alb  3.7  /  TBili  2.0<H>  /  DBili  x   /  AST  25  /  ALT  19  /  AlkPhos  80      PT/INR - ( 23 Dec 2021 06:58 )   PT: 13.5 sec;   INR: 1.13          PTT - ( 23 Dec 2021 06:58 )  PTT:31.2 sec      Urinalysis Basic - ( 21 Dec 2021 14:21 )    Color: Yellow / Appearance: Clear / S.015 / pH: x  Gluc: x / Ketone: NEGATIVE  / Bili: Negative / Urobili: 1.0 E.U./dL   Blood: x / Protein: 100 mg/dL / Nitrite: NEGATIVE   Leuk Esterase: NEGATIVE / RBC: > 10 /HPF / WBC < 5 /HPF   Sq Epi: x / Non Sq Epi: 0-5 /HPF / Bacteria: Present /HPF                RADIOLOGY & ADDITIONAL STUDIES (The following images were personally reviewed):

## 2022-01-03 LAB — SURGICAL PATHOLOGY STUDY: SIGNIFICANT CHANGE UP

## 2022-01-21 PROCEDURE — 96375 TX/PRO/DX INJ NEW DRUG ADDON: CPT

## 2022-01-21 PROCEDURE — 85025 COMPLETE CBC W/AUTO DIFF WBC: CPT

## 2022-01-21 PROCEDURE — 83880 ASSAY OF NATRIURETIC PEPTIDE: CPT

## 2022-01-21 PROCEDURE — 83605 ASSAY OF LACTIC ACID: CPT

## 2022-01-21 PROCEDURE — 74177 CT ABD & PELVIS W/CONTRAST: CPT | Mod: MC

## 2022-01-21 PROCEDURE — 80048 BASIC METABOLIC PNL TOTAL CA: CPT

## 2022-01-21 PROCEDURE — 84100 ASSAY OF PHOSPHORUS: CPT

## 2022-01-21 PROCEDURE — 94640 AIRWAY INHALATION TREATMENT: CPT

## 2022-01-21 PROCEDURE — 84484 ASSAY OF TROPONIN QUANT: CPT

## 2022-01-21 PROCEDURE — 85730 THROMBOPLASTIN TIME PARTIAL: CPT

## 2022-01-21 PROCEDURE — 81001 URINALYSIS AUTO W/SCOPE: CPT

## 2022-01-21 PROCEDURE — 88302 TISSUE EXAM BY PATHOLOGIST: CPT

## 2022-01-21 PROCEDURE — 87086 URINE CULTURE/COLONY COUNT: CPT

## 2022-01-21 PROCEDURE — 83036 HEMOGLOBIN GLYCOSYLATED A1C: CPT

## 2022-01-21 PROCEDURE — 80076 HEPATIC FUNCTION PANEL: CPT

## 2022-01-21 PROCEDURE — 87635 SARS-COV-2 COVID-19 AMP PRB: CPT

## 2022-01-21 PROCEDURE — 86850 RBC ANTIBODY SCREEN: CPT

## 2022-01-21 PROCEDURE — C1781: CPT

## 2022-01-21 PROCEDURE — U0005: CPT

## 2022-01-21 PROCEDURE — 74019 RADEX ABDOMEN 2 VIEWS: CPT

## 2022-01-21 PROCEDURE — 82962 GLUCOSE BLOOD TEST: CPT

## 2022-01-21 PROCEDURE — 86900 BLOOD TYPING SEROLOGIC ABO: CPT

## 2022-01-21 PROCEDURE — 36415 COLL VENOUS BLD VENIPUNCTURE: CPT

## 2022-01-21 PROCEDURE — 85027 COMPLETE CBC AUTOMATED: CPT

## 2022-01-21 PROCEDURE — 82248 BILIRUBIN DIRECT: CPT

## 2022-01-21 PROCEDURE — 93306 TTE W/DOPPLER COMPLETE: CPT

## 2022-01-21 PROCEDURE — 96365 THER/PROPH/DIAG IV INF INIT: CPT

## 2022-01-21 PROCEDURE — U0003: CPT

## 2022-01-21 PROCEDURE — 85610 PROTHROMBIN TIME: CPT

## 2022-01-21 PROCEDURE — 80053 COMPREHEN METABOLIC PANEL: CPT

## 2022-01-21 PROCEDURE — 71045 X-RAY EXAM CHEST 1 VIEW: CPT

## 2022-01-21 PROCEDURE — 86901 BLOOD TYPING SEROLOGIC RH(D): CPT

## 2022-01-21 PROCEDURE — 83735 ASSAY OF MAGNESIUM: CPT

## 2022-01-21 PROCEDURE — 99285 EMERGENCY DEPT VISIT HI MDM: CPT

## 2022-01-21 PROCEDURE — 93005 ELECTROCARDIOGRAM TRACING: CPT

## 2022-11-10 NOTE — DIETITIAN INITIAL EVALUATION ADULT. - WEIGHT (KG)
Occupational Therapy  OCCUPATIONAL THERAPY INITIAL EVALUATION     Kia Dumont [place], [place]        SKBY:                                                  Patient Name: Navneet Fuentes    MRN: [id number]    : 1960    Room: [address] [place] [address]      Evaluating OT: Estephania Castle OTR/TALISHA [id number]      Referring Provider: Kay Jenkins MD    Specific Provider Orders/Date:OT eval and treat 11/10/22      Diagnosis:  Cellulitis [L03.90]      Pertinent Medical History: COPD, DM, HTN       Precautions:  Fall Risk     Assessment of current deficits    [x] Functional mobility  [x]ADLs  [x] Strength               []Cognition    [x] Functional transfers   [x] IADLs         [x] Safety Awareness   [x]Endurance    [] Fine Coordination              [x] Balance      [] Vision/perception   []Sensation     []Gross Motor Coordination  [] ROM  [] Delirium                   [] Motor Control     OT PLAN OF CARE   OT POC based on physician orders, patient diagnosis and results of clinical assessment    Frequency/Duration 1-3 days/wk for 2 weeks PRN   Specific OT Treatment Interventions to include:   * Instruction/training on adapted ADL techniques and AE recommendations to increase functional independence within precautions       * Training on energy conservation strategies, correct breathing pattern and techniques to improve independence/tolerance for self-care routine  * Functional transfer/mobility training/DME recommendations for increased independence, safety, and fall prevention  * Patient/Family education to increase follow through with safety techniques and functional independence  * Recommendation of environmental modifications for increased safety with functional transfers/mobility and ADLs  * Therapeutic exercise to improve motor endurance, ROM, and functional strength for ADLs/functional transfers  * Therapeutic activities to facilitate/challenge 78 dynamic balance, stand tolerance for increased safety and independence with ADLs        Recommended Adaptive Equipment: TBD     Home Living: Pt lives with niece in 1 story house with 2 SHARON. Bathroom setup: tub/shower with hand held shower head    Equipment owned: wheeled walker    Prior Level of Function: independent with ADLs , assist from family with IADLs; functional mobility: no device    Pain Level: pt reported pain in B feet and RLE; pt agreeable to therapy  Cognition: A&O: pt alert, conversing, and following commands; [facility] command follow demonstrated. Memory:  WFL   Sequencing:  WFL   Problem solving:  WFL   Judgement/safety:  WFL     Functional Assessment:  AM-PAC Daily Activity Raw Score: 18/24   Initial Eval Status  Date: 11/10/22 Treatment Status  Date: STGs = LTGs  Time frame: 10-14 days   Feeding Independent     Grooming Sup  Mod I/I   UB Dressing Sup  Mod I/I   LB Dressing Mod A  To don socks; pt reports difficulty reaching feet at times    Mod I/I-with use of AD as appropriate/needed   Bathing Min A  Mod I/I -with use of AD as appropriate/needed   Toileting Sup  Mod I/I    Bed Mobility  Supine to sit: independent   Sit to supine: independent    Independent    Functional Transfers Sup with no device  Sit<>stand from EOB  Independent    Functional Mobility Sup with no device  Short distance in room  Independent -with device as needed to maximize independence with ADLs and functional task completion   Balance Sitting:     Static:  independent     Dynamic:Sup  Standing: Sup   I for dynamic sitting balance to maximize independence with ADLs and functional task completion    I for standing  balance to maximize independence with ADLs and functional task completion   Activity Tolerance Fair+ with light activity  Good with ADL activity.  Pt will demonstrate good understanding of education provided on EC/WS techniques    Visual/  Perceptual Glasses: none at bedside; pt states his vision is getting worse Additional long-term goal: Pt will increase functional independence to PLOF to allow pt to live in least restrictive environment. Hand Dominance R   AROM (PROM) Strength Additional Info:    RENE  [facility] WFL WFL  and wfl FMC/dexterity noted during ADL tasks and functional bed mobility   LINDA [facility] WFL WFL  and wfl FMC/dexterity noted during ADL tasks and functional bed mobility     Hearing: WFL  Sensation:  pt reported numbness in B hands and feet  Tone: WFL   Edema: BLE    Comments: Upon arrival patient lying in bed. At end of session, patient sitting EOB with call light and phone within reach, all lines and tubes intact, and alarm set. Overall patient demonstrated decreased independence and safety during completion of ADL/functional transfer/mobility tasks. Pt would benefit from continued skilled OT to increase safety and independence with completion of ADL/IADL tasks for functional independence and quality of life. Rehab Potential: Good for established goals     Patient / Family Goal: none stated    Patient and/or family were instructed on functional diagnosis, prognosis/goals and OT plan of care. Demonstrated fair+ understanding. Eval Complexity: Low    Time In: 1115  Time Out: 1128    Min Units   OT Eval Low 97165  x  1   OT Eval Medium 95912      OT Eval High 75185      OT Re-Eval [id number]       Therapeutic Ex 80806       Therapeutic Activities 20885       ADL/Self Care 53024       Orthotic Management 17746       Manual 01120     Neuro Re-Ed 97691       Non-Billable Time          Evaluation Time additionally includes thorough review of current medical information, gathering information on past medical history/social history and prior level of function, interpretation of standardized testing/informal observation of tasks, assessment of data and development of plan of care and goals.             Bea Galvez, OTR/L, [id number]

## 2023-05-18 NOTE — DIETITIAN INITIAL EVALUATION ADULT. - WEIGHT FOR BMI (LBS)
Stomach  - 2-3 x daily - 2 sets - 10 reps - 5 sec hold  - Supine Lower Trunk Rotation  - 2-3 x daily - 3 sets - 10 reps  - Seated 3 Way Exercise Ball Roll Out Stretch  - 2-3 x daily - 2 sets - 10 reps  Access Code: [email or web address]  URL: [email or web address]  Date: 03/30/2023  Prepared by: Theotis Deacon  - Supine Hip Adduction Isometric with Ball  - 1 x daily - 7 x weekly - 2 sets - 10 reps - 5\" hold  - Clamshell  - 1 x daily - 7 x weekly - 2 sets - 10 reps  - Hooklying Clamshell with Resistance  - 1 x daily - 7 x weekly - 2 sets - 10 reps  - Supine Bridge  - 1 x daily - 7 x weekly - 1 sets - 10 reps  - Beginner Reverse Clamshell  - 1 x daily - 7 x weekly - 2 sets - 10 reps  - Hooklying Single Knee to Chest Stretch  - 1 x daily - 7 x weekly - 5 sets - 1 reps - 5\" hold  Date: 04/13/2023  Prepared by: [address]  - 1 x daily - 7 x weekly - 3 sets - 10 reps  - Mini Squat with Counter Support  - 1 x daily - 7 x weekly - 3 sets - 10 reps  - Standing Hip Abduction with Counter Support  - 1 x daily - 7 x weekly - 3 sets - 10 reps  - Standing Hip Extension with Counter Support  - 1 x daily - 7 x weekly - 3 sets - 10 reps  - Standing Knee Flexion AROM with Chair Support  - 1 x daily - 7 x weekly - 3 sets - 10 reps  4/20: increase compliance with HEP at home-- focusing on gait training in [address] [place] [address] [address] for upcoming sessions  5/1: - Modified Tom Stretch  - 1 x daily - 3 sets - 30\" hold  5/4: heel lift provided    Plan: [x] Continue current frequency toward long and short term goals.     [x] Specific Instructions for subsequent treatments: continue to progress core and lumbar, hip strengthening/stretching as tolerated      Time In: 8:01 am     Time Out: 8:55 am     Electronically signed by:  Zahra Kaplan PTA [id number]

## 2024-04-27 NOTE — PATIENT PROFILE ADULT - NSPROPTRIGHTCAREGIVER_GEN_A_NUR
Patient presents with worsening shortness of breath, increased chest congestion  IV steroids  ATC nebs  Hold home inhalers in favor of Pulmicort and Perforomist  Respiratory protocol  Mucinex twice daily  Airway clearance   no
